# Patient Record
Sex: FEMALE | Race: WHITE | NOT HISPANIC OR LATINO | Employment: FULL TIME | URBAN - METROPOLITAN AREA
[De-identification: names, ages, dates, MRNs, and addresses within clinical notes are randomized per-mention and may not be internally consistent; named-entity substitution may affect disease eponyms.]

---

## 2023-12-27 ENCOUNTER — HOSPITAL ENCOUNTER (OUTPATIENT)
Dept: RADIOLOGY | Facility: HOSPITAL | Age: 58
Discharge: HOME | End: 2023-12-27
Payer: COMMERCIAL

## 2023-12-27 DIAGNOSIS — R56.9 UNSPECIFIED CONVULSIONS (MULTI): ICD-10-CM

## 2023-12-27 PROCEDURE — 70450 CT HEAD/BRAIN W/O DYE: CPT

## 2024-05-06 ENCOUNTER — APPOINTMENT (OUTPATIENT)
Dept: CARDIOLOGY | Facility: HOSPITAL | Age: 59
End: 2024-05-06

## 2024-05-06 ENCOUNTER — APPOINTMENT (OUTPATIENT)
Dept: RADIOLOGY | Facility: HOSPITAL | Age: 59
End: 2024-05-06

## 2024-05-06 ENCOUNTER — HOSPITAL ENCOUNTER (INPATIENT)
Facility: HOSPITAL | Age: 59
LOS: 2 days | Discharge: HOME | End: 2024-05-08
Attending: STUDENT IN AN ORGANIZED HEALTH CARE EDUCATION/TRAINING PROGRAM | Admitting: INTERNAL MEDICINE

## 2024-05-06 DIAGNOSIS — R53.1 LEFT-SIDED WEAKNESS: ICD-10-CM

## 2024-05-06 DIAGNOSIS — I63.9 CEREBROVASCULAR ACCIDENT (CVA), UNSPECIFIED MECHANISM (MULTI): Primary | ICD-10-CM

## 2024-05-06 DIAGNOSIS — R56.9 SEIZURE (MULTI): ICD-10-CM

## 2024-05-06 LAB
ALBUMIN SERPL-MCNC: 4.2 G/DL (ref 3.5–5)
ALP BLD-CCNC: 52 U/L (ref 35–125)
ALT SERPL-CCNC: 13 U/L (ref 5–40)
ANION GAP SERPL CALC-SCNC: 13 MMOL/L
APTT PPP: 27.3 SECONDS (ref 22–32.5)
AST SERPL-CCNC: 15 U/L (ref 5–40)
ATRIAL RATE: 71 BPM
BASOPHILS # BLD AUTO: 0.04 X10*3/UL (ref 0–0.1)
BASOPHILS NFR BLD AUTO: 0.7 %
BILIRUB SERPL-MCNC: 0.4 MG/DL (ref 0.1–1.2)
BUN SERPL-MCNC: 15 MG/DL (ref 8–25)
CALCIUM SERPL-MCNC: 9.1 MG/DL (ref 8.5–10.4)
CHLORIDE SERPL-SCNC: 107 MMOL/L (ref 97–107)
CHOLEST SERPL-MCNC: 189 MG/DL (ref 133–200)
CHOLEST/HDLC SERPL: 4.8 {RATIO}
CO2 SERPL-SCNC: 24 MMOL/L (ref 24–31)
CREAT SERPL-MCNC: 1 MG/DL (ref 0.4–1.6)
EGFRCR SERPLBLD CKD-EPI 2021: 65 ML/MIN/1.73M*2
EOSINOPHIL # BLD AUTO: 0.07 X10*3/UL (ref 0–0.7)
EOSINOPHIL NFR BLD AUTO: 1.1 %
ERYTHROCYTE [DISTWIDTH] IN BLOOD BY AUTOMATED COUNT: 12.8 % (ref 11.5–14.5)
EST. AVERAGE GLUCOSE BLD GHB EST-MCNC: 103 MG/DL
GLUCOSE BLD MANUAL STRIP-MCNC: 111 MG/DL (ref 74–99)
GLUCOSE BLD MANUAL STRIP-MCNC: 78 MG/DL (ref 74–99)
GLUCOSE BLD MANUAL STRIP-MCNC: 93 MG/DL (ref 74–99)
GLUCOSE BLD MANUAL STRIP-MCNC: 97 MG/DL (ref 74–99)
GLUCOSE SERPL-MCNC: 111 MG/DL (ref 65–99)
HBA1C MFR BLD: 5.2 %
HCT VFR BLD AUTO: 41.4 % (ref 36–46)
HDLC SERPL-MCNC: 39 MG/DL
HGB BLD-MCNC: 13.4 G/DL (ref 12–16)
IMM GRANULOCYTES # BLD AUTO: 0.02 X10*3/UL (ref 0–0.7)
IMM GRANULOCYTES NFR BLD AUTO: 0.3 % (ref 0–0.9)
INR PPP: 1.1 (ref 0.9–1.2)
LDLC SERPL CALC-MCNC: 135 MG/DL (ref 65–130)
LYMPHOCYTES # BLD AUTO: 2.04 X10*3/UL (ref 1.2–4.8)
LYMPHOCYTES NFR BLD AUTO: 33.5 %
MCH RBC QN AUTO: 31.6 PG (ref 26–34)
MCHC RBC AUTO-ENTMCNC: 32.4 G/DL (ref 32–36)
MCV RBC AUTO: 98 FL (ref 80–100)
MONOCYTES # BLD AUTO: 0.39 X10*3/UL (ref 0.1–1)
MONOCYTES NFR BLD AUTO: 6.4 %
NEUTROPHILS # BLD AUTO: 3.53 X10*3/UL (ref 1.2–7.7)
NEUTROPHILS NFR BLD AUTO: 58 %
NRBC BLD-RTO: 0 /100 WBCS (ref 0–0)
P AXIS: 117 DEGREES
P OFFSET: 187 MS
P ONSET: 128 MS
PLATELET # BLD AUTO: 199 X10*3/UL (ref 150–450)
POCT GLUCOSE: 97 MG/DL (ref 74–99)
POTASSIUM SERPL-SCNC: 3.6 MMOL/L (ref 3.4–5.1)
PR INTERVAL: 196 MS
PROT SERPL-MCNC: 7.6 G/DL (ref 5.9–7.9)
PROTHROMBIN TIME: 11.3 SECONDS (ref 9.3–12.7)
Q ONSET: 226 MS
QRS COUNT: 11 BEATS
QRS DURATION: 80 MS
QT INTERVAL: 372 MS
QTC CALCULATION(BAZETT): 404 MS
QTC FREDERICIA: 393 MS
R AXIS: -24 DEGREES
RBC # BLD AUTO: 4.24 X10*6/UL (ref 4–5.2)
SODIUM SERPL-SCNC: 144 MMOL/L (ref 133–145)
T AXIS: 118 DEGREES
T OFFSET: 412 MS
TRIGL SERPL-MCNC: 73 MG/DL (ref 40–150)
TROPONIN T SERPL-MCNC: 7 NG/L
TSH SERPL DL<=0.05 MIU/L-ACNC: 1.03 MIU/L (ref 0.27–4.2)
VENTRICULAR RATE: 71 BPM
VIT B12 SERPL-MCNC: 238 PG/ML (ref 211–946)
WBC # BLD AUTO: 6.1 X10*3/UL (ref 4.4–11.3)

## 2024-05-06 PROCEDURE — 99291 CRITICAL CARE FIRST HOUR: CPT | Performed by: NURSE PRACTITIONER

## 2024-05-06 PROCEDURE — 2500000004 HC RX 250 GENERAL PHARMACY W/ HCPCS (ALT 636 FOR OP/ED)

## 2024-05-06 PROCEDURE — 2020000001 HC ICU ROOM DAILY

## 2024-05-06 PROCEDURE — 82947 ASSAY GLUCOSE BLOOD QUANT: CPT

## 2024-05-06 PROCEDURE — 2500000005 HC RX 250 GENERAL PHARMACY W/O HCPCS: Performed by: NURSE PRACTITIONER

## 2024-05-06 PROCEDURE — 70498 CT ANGIOGRAPHY NECK: CPT

## 2024-05-06 PROCEDURE — 70551 MRI BRAIN STEM W/O DYE: CPT

## 2024-05-06 PROCEDURE — 2550000001 HC RX 255 CONTRASTS: Performed by: INTERNAL MEDICINE

## 2024-05-06 PROCEDURE — 70496 CT ANGIOGRAPHY HEAD: CPT | Performed by: RADIOLOGY

## 2024-05-06 PROCEDURE — 84484 ASSAY OF TROPONIN QUANT: CPT

## 2024-05-06 PROCEDURE — 85025 COMPLETE CBC W/AUTO DIFF WBC: CPT

## 2024-05-06 PROCEDURE — 36415 COLL VENOUS BLD VENIPUNCTURE: CPT

## 2024-05-06 PROCEDURE — 93005 ELECTROCARDIOGRAM TRACING: CPT

## 2024-05-06 PROCEDURE — 2500000004 HC RX 250 GENERAL PHARMACY W/ HCPCS (ALT 636 FOR OP/ED): Mod: JW | Performed by: STUDENT IN AN ORGANIZED HEALTH CARE EDUCATION/TRAINING PROGRAM

## 2024-05-06 PROCEDURE — 96361 HYDRATE IV INFUSION ADD-ON: CPT

## 2024-05-06 PROCEDURE — 70498 CT ANGIOGRAPHY NECK: CPT | Performed by: RADIOLOGY

## 2024-05-06 PROCEDURE — 85610 PROTHROMBIN TIME: CPT

## 2024-05-06 PROCEDURE — 70450 CT HEAD/BRAIN W/O DYE: CPT | Performed by: STUDENT IN AN ORGANIZED HEALTH CARE EDUCATION/TRAINING PROGRAM

## 2024-05-06 PROCEDURE — 2500000001 HC RX 250 WO HCPCS SELF ADMINISTERED DRUGS (ALT 637 FOR MEDICARE OP): Performed by: NURSE PRACTITIONER

## 2024-05-06 PROCEDURE — 92610 EVALUATE SWALLOWING FUNCTION: CPT | Mod: GN | Performed by: SPEECH-LANGUAGE PATHOLOGIST

## 2024-05-06 PROCEDURE — 70551 MRI BRAIN STEM W/O DYE: CPT | Performed by: STUDENT IN AN ORGANIZED HEALTH CARE EDUCATION/TRAINING PROGRAM

## 2024-05-06 PROCEDURE — 84443 ASSAY THYROID STIM HORMONE: CPT | Performed by: PSYCHIATRY & NEUROLOGY

## 2024-05-06 PROCEDURE — 3E03317 INTRODUCTION OF OTHER THROMBOLYTIC INTO PERIPHERAL VEIN, PERCUTANEOUS APPROACH: ICD-10-PCS | Performed by: STUDENT IN AN ORGANIZED HEALTH CARE EDUCATION/TRAINING PROGRAM

## 2024-05-06 PROCEDURE — S4991 NICOTINE PATCH NONLEGEND: HCPCS | Performed by: NURSE PRACTITIONER

## 2024-05-06 PROCEDURE — 80061 LIPID PANEL: CPT | Performed by: NURSE PRACTITIONER

## 2024-05-06 PROCEDURE — 96374 THER/PROPH/DIAG INJ IV PUSH: CPT

## 2024-05-06 PROCEDURE — 70450 CT HEAD/BRAIN W/O DYE: CPT

## 2024-05-06 PROCEDURE — 85730 THROMBOPLASTIN TIME PARTIAL: CPT

## 2024-05-06 PROCEDURE — 2500000002 HC RX 250 W HCPCS SELF ADMINISTERED DRUGS (ALT 637 FOR MEDICARE OP, ALT 636 FOR OP/ED): Performed by: NURSE PRACTITIONER

## 2024-05-06 PROCEDURE — 82607 VITAMIN B-12: CPT | Performed by: PSYCHIATRY & NEUROLOGY

## 2024-05-06 PROCEDURE — 99291 CRITICAL CARE FIRST HOUR: CPT | Performed by: STUDENT IN AN ORGANIZED HEALTH CARE EDUCATION/TRAINING PROGRAM

## 2024-05-06 PROCEDURE — 83036 HEMOGLOBIN GLYCOSYLATED A1C: CPT | Performed by: NURSE PRACTITIONER

## 2024-05-06 PROCEDURE — 83718 ASSAY OF LIPOPROTEIN: CPT | Performed by: NURSE PRACTITIONER

## 2024-05-06 PROCEDURE — 2500000004 HC RX 250 GENERAL PHARMACY W/ HCPCS (ALT 636 FOR OP/ED): Performed by: NURSE PRACTITIONER

## 2024-05-06 PROCEDURE — 84075 ASSAY ALKALINE PHOSPHATASE: CPT

## 2024-05-06 RX ORDER — LEVETIRACETAM 500 MG/1
1000 TABLET ORAL 2 TIMES DAILY
Status: DISCONTINUED | OUTPATIENT
Start: 2024-05-06 | End: 2024-05-07

## 2024-05-06 RX ORDER — ASPIRIN 81 MG/1
81 TABLET ORAL DAILY
Status: DISCONTINUED | OUTPATIENT
Start: 2024-05-07 | End: 2024-05-08 | Stop reason: HOSPADM

## 2024-05-06 RX ORDER — DEXTROSE 50 % IN WATER (D50W) INTRAVENOUS SYRINGE
25
Status: DISCONTINUED | OUTPATIENT
Start: 2024-05-06 | End: 2024-05-08 | Stop reason: HOSPADM

## 2024-05-06 RX ORDER — HYDRALAZINE HYDROCHLORIDE 20 MG/ML
10 INJECTION INTRAMUSCULAR; INTRAVENOUS
Status: ACTIVE | OUTPATIENT
Start: 2024-05-06 | End: 2024-05-08

## 2024-05-06 RX ORDER — HYDRALAZINE HYDROCHLORIDE 25 MG/1
25 TABLET, FILM COATED ORAL EVERY 6 HOURS PRN
Status: DISCONTINUED | OUTPATIENT
Start: 2024-05-08 | End: 2024-05-08 | Stop reason: HOSPADM

## 2024-05-06 RX ORDER — ATORVASTATIN CALCIUM 80 MG/1
80 TABLET, FILM COATED ORAL NIGHTLY
Status: DISCONTINUED | OUTPATIENT
Start: 2024-05-06 | End: 2024-05-08 | Stop reason: HOSPADM

## 2024-05-06 RX ORDER — IBUPROFEN 200 MG
1 TABLET ORAL DAILY
Status: DISCONTINUED | OUTPATIENT
Start: 2024-05-06 | End: 2024-05-08 | Stop reason: HOSPADM

## 2024-05-06 RX ORDER — LEVETIRACETAM 1000 MG/1
1000 TABLET ORAL 2 TIMES DAILY
COMMUNITY
Start: 2023-10-28 | End: 2024-05-08 | Stop reason: HOSPADM

## 2024-05-06 RX ORDER — ENOXAPARIN SODIUM 100 MG/ML
40 INJECTION SUBCUTANEOUS EVERY 24 HOURS
Status: DISCONTINUED | OUTPATIENT
Start: 2024-05-06 | End: 2024-05-08 | Stop reason: HOSPADM

## 2024-05-06 RX ORDER — LORAZEPAM 2 MG/ML
INJECTION INTRAMUSCULAR
Status: COMPLETED
Start: 2024-05-06 | End: 2024-05-06

## 2024-05-06 RX ORDER — ATORVASTATIN CALCIUM 40 MG/1
40 TABLET, FILM COATED ORAL NIGHTLY
Status: DISCONTINUED | OUTPATIENT
Start: 2024-05-06 | End: 2024-05-06

## 2024-05-06 RX ORDER — POLYETHYLENE GLYCOL 3350 17 G/17G
17 POWDER, FOR SOLUTION ORAL DAILY
Status: DISCONTINUED | OUTPATIENT
Start: 2024-05-06 | End: 2024-05-08 | Stop reason: HOSPADM

## 2024-05-06 RX ORDER — NICOTINE 7MG/24HR
1 PATCH, TRANSDERMAL 24 HOURS TRANSDERMAL DAILY
Status: DISCONTINUED | OUTPATIENT
Start: 2024-06-17 | End: 2024-05-08 | Stop reason: HOSPADM

## 2024-05-06 RX ORDER — DEXTROSE 50 % IN WATER (D50W) INTRAVENOUS SYRINGE
12.5
Status: DISCONTINUED | OUTPATIENT
Start: 2024-05-06 | End: 2024-05-08 | Stop reason: HOSPADM

## 2024-05-06 RX ORDER — LABETALOL HYDROCHLORIDE 5 MG/ML
10 INJECTION, SOLUTION INTRAVENOUS EVERY 10 MIN PRN
Status: ACTIVE | OUTPATIENT
Start: 2024-05-06 | End: 2024-05-08

## 2024-05-06 RX ADMIN — POLYETHYLENE GLYCOL 3350 17 G: 17 POWDER, FOR SOLUTION ORAL at 20:36

## 2024-05-06 RX ADMIN — LEVETIRACETAM 1000 MG: 500 TABLET, FILM COATED ORAL at 20:35

## 2024-05-06 RX ADMIN — ATORVASTATIN CALCIUM 80 MG: 80 TABLET, FILM COATED ORAL at 20:36

## 2024-05-06 RX ADMIN — SODIUM CHLORIDE 1000 ML: 900 INJECTION, SOLUTION INTRAVENOUS at 11:54

## 2024-05-06 RX ADMIN — IOHEXOL 75 ML: 350 INJECTION, SOLUTION INTRAVENOUS at 16:02

## 2024-05-06 RX ADMIN — LORAZEPAM 1 MG: 2 INJECTION INTRAMUSCULAR; INTRAVENOUS at 09:44

## 2024-05-06 RX ADMIN — Medication 2 L/MIN: at 21:29

## 2024-05-06 RX ADMIN — Medication 1 PATCH: at 13:53

## 2024-05-06 RX ADMIN — Medication 24 MG: at 09:57

## 2024-05-06 SDOH — ECONOMIC STABILITY: FOOD INSECURITY: WITHIN THE PAST 12 MONTHS, THE FOOD YOU BOUGHT JUST DIDN'T LAST AND YOU DIDN'T HAVE MONEY TO GET MORE.: NEVER TRUE

## 2024-05-06 SDOH — SOCIAL STABILITY: SOCIAL INSECURITY
WITHIN THE LAST YEAR, HAVE YOU BEEN KICKED, HIT, SLAPPED, OR OTHERWISE PHYSICALLY HURT BY YOUR PARTNER OR EX-PARTNER?: NO

## 2024-05-06 SDOH — ECONOMIC STABILITY: HOUSING INSECURITY
IN THE LAST 12 MONTHS, WAS THERE A TIME WHEN YOU DID NOT HAVE A STEADY PLACE TO SLEEP OR SLEPT IN A SHELTER (INCLUDING NOW)?: NO

## 2024-05-06 SDOH — SOCIAL STABILITY: SOCIAL NETWORK
DO YOU BELONG TO ANY CLUBS OR ORGANIZATIONS SUCH AS CHURCH GROUPS, UNIONS, FRATERNAL OR ATHLETIC GROUPS, OR SCHOOL GROUPS?: NO

## 2024-05-06 SDOH — SOCIAL STABILITY: SOCIAL INSECURITY: WITHIN THE LAST YEAR, HAVE YOU BEEN AFRAID OF YOUR PARTNER OR EX-PARTNER?: NO

## 2024-05-06 SDOH — SOCIAL STABILITY: SOCIAL INSECURITY: WITHIN THE LAST YEAR, HAVE YOU BEEN HUMILIATED OR EMOTIONALLY ABUSED IN OTHER WAYS BY YOUR PARTNER OR EX-PARTNER?: NO

## 2024-05-06 SDOH — HEALTH STABILITY: PHYSICAL HEALTH: ON AVERAGE, HOW MANY DAYS PER WEEK DO YOU ENGAGE IN MODERATE TO STRENUOUS EXERCISE (LIKE A BRISK WALK)?: 4 DAYS

## 2024-05-06 SDOH — SOCIAL STABILITY: SOCIAL NETWORK: HOW OFTEN DO YOU ATTEND CHURCH OR RELIGIOUS SERVICES?: MORE THAN 4 TIMES PER YEAR

## 2024-05-06 SDOH — SOCIAL STABILITY: SOCIAL INSECURITY
WITHIN THE LAST YEAR, HAVE YOU BEEN RAPED OR FORCED TO HAVE ANY KIND OF SEXUAL ACTIVITY BY YOUR PARTNER OR EX-PARTNER?: NO

## 2024-05-06 SDOH — HEALTH STABILITY: MENTAL HEALTH
STRESS IS WHEN SOMEONE FEELS TENSE, NERVOUS, ANXIOUS, OR CAN'T SLEEP AT NIGHT BECAUSE THEIR MIND IS TROUBLED. HOW STRESSED ARE YOU?: TO SOME EXTENT

## 2024-05-06 SDOH — SOCIAL STABILITY: SOCIAL INSECURITY: HAVE YOU HAD ANY THOUGHTS OF HARMING ANYONE ELSE?: NO

## 2024-05-06 SDOH — SOCIAL STABILITY: SOCIAL NETWORK: ARE YOU MARRIED, WIDOWED, DIVORCED, SEPARATED, NEVER MARRIED, OR LIVING WITH A PARTNER?: WIDOWED

## 2024-05-06 SDOH — SOCIAL STABILITY: SOCIAL NETWORK: HOW OFTEN DO YOU ATTEND MEETINGS OF THE CLUBS OR ORGANIZATIONS YOU BELONG TO?: NEVER

## 2024-05-06 SDOH — SOCIAL STABILITY: SOCIAL INSECURITY
WITHIN THE LAST YEAR, HAVE TO BEEN RAPED OR FORCED TO HAVE ANY KIND OF SEXUAL ACTIVITY BY YOUR PARTNER OR EX-PARTNER?: NO

## 2024-05-06 SDOH — SOCIAL STABILITY: SOCIAL NETWORK
DO YOU BELONG TO ANY CLUBS OR ORGANIZATIONS SUCH AS CHURCH GROUPS UNIONS, FRATERNAL OR ATHLETIC GROUPS, OR SCHOOL GROUPS?: NO

## 2024-05-06 SDOH — ECONOMIC STABILITY: FOOD INSECURITY: WITHIN THE PAST 12 MONTHS, YOU WORRIED THAT YOUR FOOD WOULD RUN OUT BEFORE YOU GOT THE MONEY TO BUY MORE.: NEVER TRUE

## 2024-05-06 SDOH — ECONOMIC STABILITY: HOUSING INSECURITY: IN THE LAST 12 MONTHS, HOW MANY PLACES HAVE YOU LIVED?: 1

## 2024-05-06 SDOH — ECONOMIC STABILITY: TRANSPORTATION INSECURITY
IN THE PAST 12 MONTHS, HAS LACK OF TRANSPORTATION KEPT YOU FROM MEETINGS, WORK, OR FROM GETTING THINGS NEEDED FOR DAILY LIVING?: NO

## 2024-05-06 SDOH — SOCIAL STABILITY: SOCIAL INSECURITY: WERE YOU ABLE TO COMPLETE ALL THE BEHAVIORAL HEALTH SCREENINGS?: YES

## 2024-05-06 SDOH — SOCIAL STABILITY: SOCIAL INSECURITY: ARE YOU MARRIED, WIDOWED, DIVORCED, SEPARATED, NEVER MARRIED, OR LIVING WITH A PARTNER?: WIDOWED

## 2024-05-06 SDOH — HEALTH STABILITY: PHYSICAL HEALTH: ON AVERAGE, HOW MANY MINUTES DO YOU ENGAGE IN EXERCISE AT THIS LEVEL?: 30 MIN

## 2024-05-06 SDOH — ECONOMIC STABILITY: HOUSING INSECURITY: IN THE LAST 12 MONTHS, WAS THERE A TIME WHEN YOU WERE NOT ABLE TO PAY THE MORTGAGE OR RENT ON TIME?: NO

## 2024-05-06 SDOH — SOCIAL STABILITY: SOCIAL NETWORK: HOW OFTEN DO YOU GET TOGETHER WITH FRIENDS OR RELATIVES?: NEVER

## 2024-05-06 SDOH — SOCIAL STABILITY: SOCIAL INSECURITY: DO YOU FEEL ANYONE HAS EXPLOITED OR TAKEN ADVANTAGE OF YOU FINANCIALLY OR OF YOUR PERSONAL PROPERTY?: NO

## 2024-05-06 SDOH — SOCIAL STABILITY: SOCIAL INSECURITY: HAVE YOU HAD THOUGHTS OF HARMING ANYONE ELSE?: NO

## 2024-05-06 SDOH — ECONOMIC STABILITY: TRANSPORTATION INSECURITY
IN THE PAST 12 MONTHS, HAS THE LACK OF TRANSPORTATION KEPT YOU FROM MEDICAL APPOINTMENTS OR FROM GETTING MEDICATIONS?: NO

## 2024-05-06 SDOH — SOCIAL STABILITY: SOCIAL INSECURITY: HAS ANYONE EVER THREATENED TO HURT YOUR FAMILY OR YOUR PETS?: NO

## 2024-05-06 SDOH — ECONOMIC STABILITY: FOOD INSECURITY: WITHIN THE PAST 12 MONTHS, YOU WORRIED THAT YOUR FOOD WOULD RUN OUT BEFORE YOU GOT MONEY TO BUY MORE.: NEVER TRUE

## 2024-05-06 SDOH — ECONOMIC STABILITY: FOOD INSECURITY: HOW HARD IS IT FOR YOU TO PAY FOR THE VERY BASICS LIKE FOOD, HOUSING, MEDICAL CARE, AND HEATING?: NOT VERY HARD

## 2024-05-06 SDOH — HEALTH STABILITY: MENTAL HEALTH
DO YOU FEEL STRESS - TENSE, RESTLESS, NERVOUS, OR ANXIOUS, OR UNABLE TO SLEEP AT NIGHT BECAUSE YOUR MIND IS TROUBLED ALL THE TIME - THESE DAYS?: TO SOME EXTENT

## 2024-05-06 SDOH — ECONOMIC STABILITY: INCOME INSECURITY: IN THE LAST 12 MONTHS, WAS THERE A TIME WHEN YOU WERE NOT ABLE TO PAY THE MORTGAGE OR RENT ON TIME?: NO

## 2024-05-06 SDOH — HEALTH STABILITY: MENTAL HEALTH: EXPERIENCED ANY OF THE FOLLOWING LIFE EVENTS: OTHER (COMMENT)

## 2024-05-06 SDOH — SOCIAL STABILITY: SOCIAL INSECURITY: DO YOU FEEL UNSAFE GOING BACK TO THE PLACE WHERE YOU ARE LIVING?: NO

## 2024-05-06 SDOH — ECONOMIC STABILITY: INCOME INSECURITY: HOW HARD IS IT FOR YOU TO PAY FOR THE VERY BASICS LIKE FOOD, HOUSING, MEDICAL CARE, AND HEATING?: NOT VERY HARD

## 2024-05-06 SDOH — SOCIAL STABILITY: SOCIAL NETWORK
IN A TYPICAL WEEK, HOW MANY TIMES DO YOU TALK ON THE PHONE WITH FAMILY, FRIENDS, OR NEIGHBORS?: MORE THAN THREE TIMES A WEEK

## 2024-05-06 SDOH — SOCIAL STABILITY: SOCIAL NETWORK: HOW OFTEN DO YOU ATTENT MEETINGS OF THE CLUB OR ORGANIZATION YOU BELONG TO?: NEVER

## 2024-05-06 SDOH — SOCIAL STABILITY: SOCIAL INSECURITY: ARE YOU OR HAVE YOU BEEN THREATENED OR ABUSED PHYSICALLY, EMOTIONALLY, OR SEXUALLY BY ANYONE?: NO

## 2024-05-06 SDOH — SOCIAL STABILITY: SOCIAL INSECURITY: DOES ANYONE TRY TO KEEP YOU FROM HAVING/CONTACTING OTHER FRIENDS OR DOING THINGS OUTSIDE YOUR HOME?: NO

## 2024-05-06 SDOH — ECONOMIC STABILITY: TRANSPORTATION INSECURITY: IN THE PAST 12 MONTHS, HAS LACK OF TRANSPORTATION KEPT YOU FROM MEDICAL APPOINTMENTS OR FROM GETTING MEDICATIONS?: NO

## 2024-05-06 SDOH — SOCIAL STABILITY: SOCIAL INSECURITY: ARE THERE ANY APPARENT SIGNS OF INJURIES/BEHAVIORS THAT COULD BE RELATED TO ABUSE/NEGLECT?: NO

## 2024-05-06 SDOH — SOCIAL STABILITY: SOCIAL INSECURITY: ABUSE: ADULT

## 2024-05-06 ASSESSMENT — PAIN - FUNCTIONAL ASSESSMENT
PAIN_FUNCTIONAL_ASSESSMENT: 0-10

## 2024-05-06 ASSESSMENT — COGNITIVE AND FUNCTIONAL STATUS - GENERAL
DAILY ACTIVITIY SCORE: 24
MOBILITY SCORE: 24
PATIENT BASELINE BEDBOUND: NO

## 2024-05-06 ASSESSMENT — LIFESTYLE VARIABLES
SUBSTANCE_ABUSE_PAST_12_MONTHS: NO
AUDIT-C TOTAL SCORE: 0
HOW MANY STANDARD DRINKS CONTAINING ALCOHOL DO YOU HAVE ON A TYPICAL DAY: PATIENT DOES NOT DRINK
HOW OFTEN DO YOU HAVE 6 OR MORE DRINKS ON ONE OCCASION: NEVER
HOW OFTEN DO YOU HAVE A DRINK CONTAINING ALCOHOL: NEVER
AUDIT-C TOTAL SCORE: 0
SKIP TO QUESTIONS 9-10: 1
PRESCIPTION_ABUSE_PAST_12_MONTHS: NO

## 2024-05-06 ASSESSMENT — PAIN SCALES - GENERAL
PAINLEVEL_OUTOF10: 0 - NO PAIN

## 2024-05-06 ASSESSMENT — ACTIVITIES OF DAILY LIVING (ADL)
FEEDING YOURSELF: INDEPENDENT
ADEQUATE_TO_COMPLETE_ADL: YES
WALKS IN HOME: INDEPENDENT
BATHING: INDEPENDENT
TOILETING: INDEPENDENT
DRESSING YOURSELF: INDEPENDENT
LACK_OF_TRANSPORTATION: NO
GROOMING: INDEPENDENT
JUDGMENT_ADEQUATE_SAFELY_COMPLETE_DAILY_ACTIVITIES: YES
HEARING - LEFT EAR: FUNCTIONAL
PATIENT'S MEMORY ADEQUATE TO SAFELY COMPLETE DAILY ACTIVITIES?: YES
HEARING - RIGHT EAR: FUNCTIONAL

## 2024-05-06 ASSESSMENT — ENCOUNTER SYMPTOMS
SPEECH DIFFICULTY: 1
SEIZURES: 1
WEAKNESS: 1

## 2024-05-06 ASSESSMENT — PATIENT HEALTH QUESTIONNAIRE - PHQ9
2. FEELING DOWN, DEPRESSED OR HOPELESS: SEVERAL DAYS
SUM OF ALL RESPONSES TO PHQ9 QUESTIONS 1 & 2: 2
1. LITTLE INTEREST OR PLEASURE IN DOING THINGS: SEVERAL DAYS

## 2024-05-06 NOTE — ED TRIAGE NOTES
LEFT sided weakness that started 0850 unequal arm strength unable to lift arm and leg on left side. Drift noted on left side. Pt has hx of seizures

## 2024-05-06 NOTE — H&P
History Of Present Illness  Lexy Dunaway is a 58 y.o. female with PMHx of remote R MCA infarct and seizures now presenting with acute left sided weakness and difficulty with word finding.  Last known normal was about 0830 when she notified a co-worker that she was having left sided jerking paresthesias and weakness of the LUE.  Symptoms evolved to include severe left sided weakness and difficulty with word finding.  She was BIBA to [facility] with an NIH score of 10, had grossly normal labs and a head CT negative for acute bleed, and was administered weight based TNK.  He symptoms have normalized now with an NIH of 0.  She is admitted to the ICU for post CVA thrombolytic monitoring and care.          She sees Dr. Davy Mckeon for Neurology, last seen in March, 2024.  NO changes, continued AED keppra.  Last seizure was in 2017.         Past Medical History  Past Medical History:   Diagnosis Date    Seizures (Multi)      Home Medications:  Keppra 1000mg BID     Surgical History  History reviewed. No pertinent surgical history.     Social History  She reports that she has been smoking cigarettes. She does not have any smokeless tobacco history on file. No history on file for alcohol use and drug use.    Family History  No family history on file.     Allergies  Patient has no known allergies.    Review of Systems   Neurological:  Positive for seizures, speech difficulty and weakness.        Improved s/p TNK   All other systems reviewed and are negative.    Currently complaint free      Physical Exam  Vitals and nursing note reviewed.   Constitutional:       Appearance: She is obese.   Eyes:      Pupils: Pupils are equal, round, and reactive to light.   Cardiovascular:      Rate and Rhythm: Normal rate and regular rhythm.   Pulmonary:      Effort: Pulmonary effort is normal.      Breath sounds: Normal breath sounds.   Abdominal:      General: Bowel sounds are normal. There is no distension.      Tenderness: There is  "no abdominal tenderness.   Musculoskeletal:         General: Normal range of motion.   Skin:     General: Skin is warm and dry.      Capillary Refill: Capillary refill takes less than 2 seconds.   Neurological:      Mental Status: She is alert and oriented to person, place, and time.      Cranial Nerves: No cranial nerve deficit.      Motor: No weakness.   Psychiatric:         Mood and Affect: Mood normal.         Behavior: Behavior normal.          Last Recorded Vitals  Blood pressure 108/67, pulse 67, temperature 36.6 °C (97.9 °F), resp. rate (!) 22, height 1.575 m (5' 2\"), weight 96.7 kg (213 lb 3 oz), SpO2 99%.    Relevant Results      ECG 12 lead    Result Date: 5/6/2024  Sinus rhythm with Premature ventricular complexes or Fusion complexes Cannot rule out Anterior infarct , age undetermined ST & T wave abnormality, consider lateral ischemia Abnormal ECG When compared with ECG of 06-MAY-2024 09:29, (unconfirmed) Previous ECG has undetermined rhythm, needs review    CT brain attack head wo IV contrast    Result Date: 5/6/2024  Interpreted By:  Johnny Haro, STUDY: CT BRAIN ATTACK HEAD WO IV CONTRAST;  5/6/2024 9:25 am   INDICATION: Signs/Symptoms:Stroke Evaluation.   COMPARISON: CT brain 12/27/2023 and MRI brain 05/23/2023   ACCESSION NUMBER(S): [id number]   ORDERING CLINICIAN: RAFFAELE LOPEZ   TECHNIQUE: Noncontrast axial CT scan of head was performed.   FINDINGS: Parenchyma: There is no intracranial hemorrhage. The grey-white differentiation is intact. There is no mass effect or midline shift. Redemonstration encephalomalacia right frontotemporal lobe, from prior right MCA distribution infarct.   CSF Spaces: The ventricles, sulci and basal cisterns are within normal limits for age.   Extra-Axial Fluid: There is no extraaxial fluid collection.   Calvarium: The calvarium is unremarkable.   Paranasal sinuses: Visualized paranasal sinuses are clear.   Mastoids: Clear.   Orbits: Normal.   Soft tissues: " Unremarkable.       No acute intracranial hemorrhage, mass effect, or CT apparent acute infarct. Stable remote large right MCA distribution infarct.   Johnny Haro discussed the significance and urgency of this critical finding by telephone with  RAFFAELEYOLA LOPEZ on 5/6/2024 at 10:07 am.  (**-RCF-**) Findings:  See findings.         Signed by: Johnny Haro 5/6/2024 10:07 AM Dictation workstation:   [id number]       Assessment/Plan   Principal Problem:    Cerebrovascular accident (CVA), unspecified mechanism (Multi)  58 y.o. female with PMHx of remote R MCA infarct and seizures now presenting with acute CVA with left sided weakness and difficulty with word finding.  She presented with NIH score of 10, had grossly normal labs and a head CT negative for acute bleed, and was administered weight based TNK.  He symptoms have normalized now with an NIH of 0.  She is admitted to the ICU for post CVA thrombolytic monitoring and care     - CVA Thrombolytic Order Set per protocol   - Follow up head CT in the morning or sooner if acute change in neuro exam  - Neuro Consult   - Check Hemoglobin A1c and lipid panel for CV risk stratification   - Nicotine patch for acute nicotine cessation; 1.5 ppd x 30 years, recently converted to vape          I spent 38 minutes in the professional and overall care of this patient.    Dispo:  ICU for close neuro monitoring     ASPEN Arias-CNP

## 2024-05-06 NOTE — ED PROVIDER NOTES
History/Exam limitations: none.   Additional history was obtained from patient and EMS personnel.    HPI:       Lexy Dunaway is a 58 y.o. with a history of previous large remote right MCA infarct presenting to the emergency department with left-sided weakness.  Patient was reportedly at work when symptoms started around 8:30 AM.  She started having difficulty moving her left arm and left leg as well as difficulty with speech and therefore EMS was called.  EMS code brain attack in the field.  Patient is not on any blood thinners and is only taking Keppra due to history of seizures.  Upon arrival patient is complaining of left-sided weakness and difficulty talking.  She denies any chest pain, shortness of breath, head injury or head trauma.     Last Known Well Time: 0830        ------------------------------------------------------------------------------------------------------------------------------------------     Physical Exam:    ED Triage Vitals [05/06/24 0929]   Temp Heart Rate Respirations BP   -- 74 (!) 28 (!) 140/108      Pulse Ox Temp src Heart Rate Source Patient Position   99 % -- -- --      BP Location FiO2 (%)     -- --          Gen: Not in acute distress  Head/Neck: NCAT  Eyes: Anicteric sclerae, noninjected conjunctivae  Nose: No rhinorrhea  Mouth:  MMM  Heart: Regular rate and rhythm w/ no murmurs, rubs, gallops  Lungs: CTAB w/o wheezes, rales, rhonchi, no increased work of breathing  Abdomen: Soft, NT/ND  Musculoskeletal: No deformities  Extremities: No edema.  Neurologic: Please see below for NIHSS  Skin: No rashes noted  Psychological: Calm        Interval: Baseline  Time: 12:04 PM  Person Administering Scale: Senia Holly PA-C     1a  Level of consciousness: 1=not alert but arousable by minor stimulation to obey, answer or respond   1b. LOC questions:  0=Performs both tasks correctly   1c. LOC commands: 0=Performs both tasks correctly   2.  Best Gaze: 0=normal   3. Visual: 0=No visual loss    4. Facial Palsy: 1=Minor paralysis (flattened nasolabial fold, asymmetric on smiling)   5a. Motor left arm: 2=Some effort against gravity, limb cannot get to or maintain (if cured) 90 (or 45) degrees, drifts down to bed, but has some effort against gravity   5b.  Motor right arm: 0=No drift, limb holds 90 (or 45) degrees for full 10 seconds   6a. motor left le=Drift, limb holds 90 (or 45) degrees but drifts down before full 10 seconds: does not hit bed   6b  Motor right le=No drift, limb holds 90 (or 45) degrees for full 10 seconds   7. Limb Ataxia: 0=Absent   8.  Sensory: 2=Severe to total sensory loss; patient is not aware of being touched in face, arm, leg   9. Best Language:  0=No aphasia, normal   10. Dysarthria: 1=Mild to moderate, patient slurs at least some words and at worst, can be understood with some difficulty   11. Extinction and Inattention: 2=Profound andreia-inattention or andreia-inattention to more than one modality. Does not recognize own hand or orients only to one side of space     Total:   10         VAN: VAN: Negative     ------------------------------------------------------------------------------------------------------------------------------------------     Medical Decision Making:     Patient seen in conjunction with attending physician Dr. Meredith.     Patient is a 58-year-old female presenting to the emergency department for evaluation of left-sided weakness.  Code brain attack was called due to NIH of 10 due to loss of sensation of the left arm and leg, left arm having minimal effort against gravity, drift to the left leg, facial droop to the left and slurred speech.  CT of the head showed no acute intracranial hemorrhage with a stable remote large right MCA infarct.  Patient is not on any blood thinners and therefore TNK was given and consent was signed after discussion about the risks of giving TNK.  Initial troponin 7, PT and INR normal.  CBC showed no leukocytosis or  anemia.  CMP showed no Offerman abnormalities, glucose 97.  After TNK was given patient began to improve.  Repeat examination showed a NIH of 4.  Due to TNK administration patient warrants ICU admission for every hour neurochecks.  I spoke with the intensivist team who agreed with admission for further management of TIA/CVA.     CT brain attack head wo IV contrast   Final Result   No acute intracranial hemorrhage, mass effect, or CT apparent acute   infarct. Stable remote large right MCA distribution infarct.        Johnny Haro discussed the significance and urgency of this   critical finding by telephone with  RAFFAELE LOPEZ on 5/6/2024 at 10:07   am.  (**-RCF-**) Findings:  See findings.                       Signed by: Johnny Haro 5/6/2024 10:07 AM   Dictation workstation:   [id number]        Labs Reviewed   COMPREHENSIVE METABOLIC PANEL - Abnormal       Result Value    Glucose 111 (*)     Sodium 144      Potassium 3.6      Chloride 107      Bicarbonate 24      Urea Nitrogen 15      Creatinine 1.00      eGFR 65      Calcium 9.1      Albumin 4.2      Alkaline Phosphatase 52      Total Protein 7.6      AST 15      Bilirubin, Total 0.4      ALT 13      Anion Gap 13     TROPONIN T, HIGH SENSITIVITY - Normal    Troponin T, High Sensitivity 7     PROTIME-INR - Normal    Protime 11.3      INR 1.1      Narrative:     INR Therapeutic Range: 2.0-3.5   APTT - Normal    aPTT 27.3     POCT GLUCOSE - Normal    POCT Glucose 97     POCT GLUCOSE METER - Normal    POCT Glucose 97     CBC WITH AUTO DIFFERENTIAL    WBC 6.1      nRBC 0.0      RBC 4.24      Hemoglobin 13.4      Hematocrit 41.4      MCV 98      MCH 31.6      MCHC 32.4      RDW 12.8      Platelets 199      Neutrophils % 58.0      Immature Granulocytes %, Automated 0.3      Lymphocytes % 33.5      Monocytes % 6.4      Eosinophils % 1.1      Basophils % 0.7      Neutrophils Absolute 3.53      Immature Granulocytes Absolute, Automated 0.02      Lymphocytes  Absolute 2.04      Monocytes Absolute 0.39      Eosinophils Absolute 0.07      Basophils Absolute 0.04           ED Course as of 05/06/24 1204   Mon May 06, 2024   0932 EKG my interpretation shows sinus rhythm, rate of 71 beats minute.  There is significant baseline artifact due to patient tremor.  QTc is 404 ms, NJ interval 196.  There is no obvious ST elevation or depression, no acute ischemic pattern.  No STEMI. [NT]   0949 I reassessed the patient, NIH is 10.  She has complete loss of sensation to the left arm and leg, left arm has minimal effort against gravity, left leg drifts to the bed.  She typically has normal strength in all extremities.  She also has some facial droop and mild slurred speech.  NIH is 10.  I feel that given her left-sided weakness this is significantly disabling and I am concerned for stroke.  She is not on any anticoagulation, head CT which I reviewed does not appear to have any evidence of mass effect or infarct.  She does have a history of ischemic stroke more than 2 years ago.  No history of hemorrhage.  Informed consent was obtained for TNK administration including discussion of the risks such as intracranial hemorrhage, GI bleeding, other life-threatening hemorrhage that can be caused by this medication. [NT]      ED Course User Index  [NT] Jorge Luis Meredith DO         Diagnoses as of 05/06/24 1204   Cerebrovascular accident (CVA), unspecified mechanism (Multi)   Left-sided weakness     EKG interpreted by my physician     Independent Interpretation of Studies: I independently interpreted the CT head and see No obvious evidence of intracranial hemorrhage    Chronic Medical Conditions Significantly Affecting Care: History of CVA/TIA    Social Determinants of Health Significantly Affecting Care:  None     External Records Reviewed: I reviewed recent and relevant outside records including: Previous CT of the head on 12/1/2023 showing a large amount right MCA infarct.     Discussion  of Management with Other Providers:   I discussed the patient/results with: Intensivist admitting team Dr. Hanks and Milo who agreed with admission.       IV Thrombolysis IV Thrombolysis Checklist        IV Thrombolysis Given: Yes Thrombolysis Administration: administration time 0957 Patient meets inclusion and exclusion criteria with expected benefits exceeding the risks of IV thrombolysis therapy or withholding therapy. Patient/ family understand potential risks & benefits and consent to IV Thrombolysis.   Discussed the diagnosis of suspected ischemic stroke and options for treatment, including alternative treatments. For patients meeting inclusion and exclusion criteria, the expected benefits exceed the risks of IV thrombolysis therapy or withholding therapy. The main risk of IV thrombolysis therapy is bleeding into the brain or body that may require blood transfusions or lead to death. In clinical studies, the rate of death was not higher in patients who received IV thrombolysis compared to those who did not. Other risks include allergic reactions, and with any procedure there is always the possibility of an unexpected complication.  Were there delays to thrombolysis administration?: no        Procedure  Procedures     Patient had a high probability of life-threatening deterioration due to injuries or symptoms concerning for TIA/CVA requiring my direct attention, intervention and management. I spent 42 minutes of critical care time with this patient involving bedside care, chart review, interpreting labs and diagnostics, and consultations.  Excluding separately billable procedures.     Senia Holly PA-C  05/06/24 [id number]

## 2024-05-06 NOTE — PROGRESS NOTES
Pharmacy Medication History Review    Lexy Dunaway is a 58 y.o. female admitted for Cerebrovascular accident (CVA), unspecified mechanism (Multi). Pharmacy reviewed the patient's [id number] medications and allergies for accuracy.    Medications ADDED:  none  Medications CHANGED:  Levetiracetam 1000mg - updated dose  Medications REMOVED:   none     The list below reflects the updated PTA list. Comments regarding how patient may be taking medications differently can be found in the Admit Orders Activity  Prior to Admission Medications   Prescriptions Last Dose Informant   levETIRAcetam (Keppra) 1,000 mg tablet  Self   Sig: Take 1 tablet (1,000 mg) by mouth 2 times a day.      Facility-Administered Medications: None        The list below reflects the updated allergy list. Please review each documented allergy for additional clarification and justification.  Allergies  Reviewed by Radha Michel on 5/6/2024   No Known Allergies         Pharmacy has been updated to [facility].    Sources used to complete the med history include dispense history, PTA medication list, patient interview. Patient is a good historian.    Below are additional concerns with the patient's PTA list.  Patient denies any OTC medications or additional prescription medication.    Radha Michel  Please reach out via [employer] Secure Chat for questions

## 2024-05-06 NOTE — CARE PLAN
The patient's goals for the shift include able to eat.    The clinical goals for the shift include stable neurologically, no  change  in mental status, no bleeding      Problem: General Stroke  Goal: Establish a mutual long term goal with patient by discharge  5/6/2024 1429 by Srikanth Gerber RN  Outcome: Progressing  5/6/2024 1429 by Srikanth Gerber RN  Outcome: Progressing  Goal: Demonstrate improvement in neurological exam throughout the shift  5/6/2024 1429 by Srikanth Gerber RN  Outcome: Progressing  5/6/2024 1429 by Srikanth Gerber RN  Outcome: Progressing  Goal: Maintain BP within ordered limits throughout shift  5/6/2024 1429 by Srikanth Gerber RN  Outcome: Progressing  5/6/2024 1429 by Srikanth Gerber RN  Outcome: Progressing  Goal: Participate in treatment (ie., meds, therapy) throughout shift  5/6/2024 1429 by Srikanht Gerber RN  Outcome: Progressing  5/6/2024 1429 by Srikanth Gerber RN  Outcome: Progressing  Goal: No symptoms of aspiration throughout shift  5/6/2024 1429 by Srikanth Gerber RN  Outcome: Progressing  5/6/2024 1429 by Srikanth Gerber RN  Outcome: Progressing  Goal: No symptoms of hemorrhage throughout shift  5/6/2024 1429 by Srikanth Gerber RN  Outcome: Progressing  5/6/2024 1429 by Srikanth Gerber RN  Outcome: Progressing     Problem: Pain - Adult  Goal: Verbalizes/displays adequate comfort level or baseline comfort level  5/6/2024 1429 by Srikanth Gerber RN  Outcome: Progressing  5/6/2024 1429 by Srikanth Gerber RN  Outcome: Progressing     Problem: Safety - Adult  Goal: Free from fall injury  5/6/2024 1429 by Srikanth Gerber RN  Outcome: Progressing  5/6/2024 1429 by Srikanth Gerber RN  Outcome: Progressing     Problem: Discharge Planning  Goal: Discharge to home or other facility with appropriate resources  5/6/2024 1429 by Srikanth Gerber RN  Outcome: Progressing  5/6/2024 1429 by Srikanth Gerber, RN  Outcome: Progressing     Problem: Chronic  Conditions and Co-morbidities  Goal: Patient's chronic conditions and co-morbidity symptoms are monitored and maintained or improved  5/6/2024 1429 by Srikanth Gerber RN  Outcome: Progressing  5/6/2024 1429 by Srikanth Gerber RN  Outcome: Progressing     Problem: Skin  Goal: Participates in plan/prevention/treatment measures  5/6/2024 1429 by Srikanth Gerber RN  Outcome: Progressing  Flowsheets (Taken 5/6/2024 1429)  Participates in plan/prevention/treatment measures:   Discuss with provider PT/OT consult   Elevate heels   Increase activity/out of bed for meals  5/6/2024 1429 by Srikanth Gerber RN  Outcome: Progressing  Flowsheets (Taken 5/6/2024 1429)  Participates in plan/prevention/treatment measures:   Discuss with provider PT/OT consult   Elevate heels   Increase activity/out of bed for meals  Goal: Prevent/manage excess moisture  5/6/2024 1429 by Srikanth Gerber RN  Outcome: Progressing  Flowsheets (Taken 5/6/2024 1429)  Prevent/manage excess moisture:   Cleanse incontinence/protect with barrier cream   Moisturize dry skin   Monitor for/manage infection if present  5/6/2024 1429 by Srikanth Gerber RN  Outcome: Progressing  Flowsheets (Taken 5/6/2024 1429)  Prevent/manage excess moisture:   Cleanse incontinence/protect with barrier cream   Moisturize dry skin   Monitor for/manage infection if present  Goal: Prevent/minimize sheer/friction injuries  5/6/2024 1429 by Srikanth Gerber RN  Outcome: Progressing  Flowsheets (Taken 5/6/2024 1429)  Prevent/minimize sheer/friction injuries:   Complete micro-shifts as needed if patient unable. Adjust patient position to relieve pressure points, not a full turn   HOB 30 degrees or less   Increase activity/out of bed for meals   Turn/reposition every 2 hours/use positioning/transfer devices   Use pull sheet   Utilize specialty bed per algorithm  5/6/2024 1429 by Srikanth Gerber RN  Outcome: Progressing  Flowsheets (Taken 5/6/2024 1429)  Prevent/minimize  sheer/friction injuries:   Complete micro-shifts as needed if patient unable. Adjust patient position to relieve pressure points, not a full turn   HOB 30 degrees or less   Increase activity/out of bed for meals   Turn/reposition every 2 hours/use positioning/transfer devices   Use pull sheet   Utilize specialty bed per algorithm  Goal: Promote/optimize nutrition  5/6/2024 1429 by Srikanth Gerber RN  Outcome: Progressing  Flowsheets (Taken 5/6/2024 1429)  Promote/optimize nutrition:   Monitor/record intake including meals   Consume > 50% meals/supplements  5/6/2024 1429 by Srikanth Gerber RN  Outcome: Progressing  Flowsheets (Taken 5/6/2024 1429)  Promote/optimize nutrition:   Monitor/record intake including meals   Consume > 50% meals/supplements     Problem: Fall/Injury  Goal: Not fall by end of shift  Outcome: Progressing  Goal: Be free from injury by end of the shift  Outcome: Progressing  Goal: Verbalize understanding of personal risk factors for fall in the hospital  Outcome: Progressing  Goal: Verbalize understanding of risk factor reduction measures to prevent injury from fall in the home  Outcome: Progressing  Goal: Use assistive devices by end of the shift  Outcome: Progressing  Goal: Pace activities to prevent fatigue by end of the shift  Outcome: Progressing

## 2024-05-06 NOTE — ED PROCEDURE NOTE
Procedure  Critical Care    Performed by: Jorge Luis Meredith DO  Authorized by: Jorge Luis Meredith DO    Critical care provider statement:     Critical care time (minutes):  30    Critical care time was exclusive of:  Separately billable procedures and treating other patients    Critical care was necessary to treat or prevent imminent or life-threatening deterioration of the following conditions:  CNS failure or compromise    Critical care was time spent personally by me on the following activities:  Ordering and review of radiographic studies, ordering and performing treatments and interventions, ordering and review of laboratory studies, re-evaluation of patient's condition, examination of patient, development of treatment plan with patient or surrogate and obtaining history from patient or surrogate               Jorge Luis Meredith DO  05/06/24 9524

## 2024-05-06 NOTE — CONSULTS
Neurology Consult    History Of Present Illness  58 y.o. female presenting with listed PMHX, coming for evaluation of  stroke-like sympt's. Hx is obtained from chart and patient.  Pat came to ED after developed at work (around 8:30 AM) Lt UE/LE weakness, Lt facial droop w/ some numbness along w/ difficulty getting words out.  Pat presented in stroke code within IV thrombolysis window which was given at ER.  No LVO suspected and CTA was not done at that time.  Pat feels significant improvement and don't feel any obvious significant deficits.  Pat denies assoc KEITH, n/v, vision changes, confusion, loc, cp, or palpitations  Mentions having hx of Sz's been tx w/ keppra w/ last Sz about 3 yrs ago w/ good AED compliance.  Mentions that on previous Sz's she had Lt side numbness and shakiness.  She has hx of stroke w/o significant residual defictis, but not on antiplatelets or anticoagulants.  Denies alcohol abuse or starting a recent med.      Past Medical History  Past Medical History:   Diagnosis Date    Seizures (Multi)      Surgical History  History reviewed. No pertinent surgical history.  Social History  Social History     Tobacco Use    Smoking status: Every Day     Current packs/day: 1.00     Types: Cigarettes   Vaping Use    Vaping status: Every Day     Allergies  Patient has no known allergies.  Medications Prior to Admission   Medication Sig Dispense Refill Last Dose    levETIRAcetam (Keppra) 1,000 mg tablet Take 1 tablet (1,000 mg) by mouth 2 times a day.   5/6/2024 at am       ROS: See hpi and H&P  Neurological Exam  Physical Exam  General:  lying in bed, alone, cooperative to the interview and physical exam, no distress, fair historian.    Head and Neck:  atraumatic; no carotids bruits audible bilaterally.  Mental Status:  Alert, oriented to person, place and time fully, follow simple and complex commands, no right to left confusion, normal speech fluency and comprehension, normal naming and repetition, normal  "fund of knowledge.     Cranial Nerves II-XII:  positive papillary reactivity to light and accommodation from 3mm to 2 mm bilaterally, normal extraocular movements, no visual field deficits to confrontational testing,  normal bilateral facial sensation to light touch at all quadrants, normal jaw muscle strength, normal bilateral facial grimace, normal hearing to finger rustles bilaterally, normal tongue protrusion and palate elevation, normal shoulder shrug.     Motor:  No drift in bilateral upper extremities, muscle strength (MRC score) seems to be is 5/5 in Rt side, strength in Lt UE is -5/5 UMN pattern; at Lt LE seem to be 5/5.  Coordination:  Normal finger to nose and rapid alternating movements at all ext's.     Reflexes:  +2/4 at all ext’s; plantar reflex is flexor bilaterally.   Sensory:  Normal light touch at all extremities.     Gait: deferred.      NIHSS: 0    Last Recorded Vitals  Blood pressure 105/66, pulse 67, temperature 36.7 °C (98.1 °F), temperature source Oral, resp. rate 25, height 1.575 m (5' 2.01\"), weight 94.6 kg (208 lb 8.9 oz), SpO2 94%.    Relevant Results  Results for orders placed or performed during the hospital encounter of 05/06/24 (from the past 96 hour(s))   ECG 12 lead   Result Value Ref Range    Ventricular Rate 71 BPM    Atrial Rate 71 BPM    OH Interval 196 ms    QRS Duration 80 ms    QT Interval 372 ms    QTC Calculation(Bazett) 404 ms    P Axis 117 degrees    R Axis -24 degrees    T Axis 118 degrees    QRS Count 11 beats    Q Onset 226 ms    P Onset 128 ms    P Offset 187 ms    T Offset 412 ms    QTC Fredericia 393 ms   CBC and Auto Differential   Result Value Ref Range    WBC 6.1 4.4 - 11.3 x10*3/uL    nRBC 0.0 0.0 - 0.0 /100 WBCs    RBC 4.24 4.00 - 5.20 x10*6/uL    Hemoglobin 13.4 12.0 - 16.0 g/dL    Hematocrit 41.4 36.0 - 46.0 %    MCV 98 80 - 100 fL    MCH 31.6 26.0 - 34.0 pg    MCHC 32.4 32.0 - 36.0 g/dL    RDW 12.8 11.5 - 14.5 %    Platelets 199 150 - 450 x10*3/uL    " Neutrophils % 58.0 40.0 - 80.0 %    Immature Granulocytes %, Automated 0.3 0.0 - 0.9 %    Lymphocytes % 33.5 13.0 - 44.0 %    Monocytes % 6.4 2.0 - 10.0 %    Eosinophils % 1.1 0.0 - 6.0 %    Basophils % 0.7 0.0 - 2.0 %    Neutrophils Absolute 3.53 1.20 - 7.70 x10*3/uL    Immature Granulocytes Absolute, Automated 0.02 0.00 - 0.70 x10*3/uL    Lymphocytes Absolute 2.04 1.20 - 4.80 x10*3/uL    Monocytes Absolute 0.39 0.10 - 1.00 x10*3/uL    Eosinophils Absolute 0.07 0.00 - 0.70 x10*3/uL    Basophils Absolute 0.04 0.00 - 0.10 x10*3/uL   Comprehensive metabolic panel   Result Value Ref Range    Glucose 111 (H) 65 - 99 mg/dL    Sodium 144 133 - 145 mmol/L    Potassium 3.6 3.4 - 5.1 mmol/L    Chloride 107 97 - 107 mmol/L    Bicarbonate 24 24 - 31 mmol/L    Urea Nitrogen 15 8 - 25 mg/dL    Creatinine 1.00 0.40 - 1.60 mg/dL    eGFR 65 >60 mL/min/1.73m*2    Calcium 9.1 8.5 - 10.4 mg/dL    Albumin 4.2 3.5 - 5.0 g/dL    Alkaline Phosphatase 52 35 - 125 U/L    Total Protein 7.6 5.9 - 7.9 g/dL    AST 15 5 - 40 U/L    Bilirubin, Total 0.4 0.1 - 1.2 mg/dL    ALT 13 5 - 40 U/L    Anion Gap 13 <=19 mmol/L   Troponin T, High Sensitivity   Result Value Ref Range    Troponin T, High Sensitivity 7 <=14 ng/L   Protime-INR   Result Value Ref Range    Protime 11.3 9.3 - 12.7 seconds    INR 1.1 0.9 - 1.2   APTT   Result Value Ref Range    aPTT 27.3 22.0 - 32.5 seconds   Hemoglobin A1c   Result Value Ref Range    Hemoglobin A1C 5.2 See below %    Estimated Average Glucose 103 Not Established mg/dL   Lipid panel   Result Value Ref Range    Cholesterol 189 133 - 200 mg/dL    HDL-Cholesterol 39.0 (L) >50.0 mg/dL    Cholesterol/HDL Ratio 4.8 SEE COMMENT    LDL Calculated 135 (H) 65 - 130 mg/dL    Triglycerides 73 40 - 150 mg/dL   POCT GLUCOSE   Result Value Ref Range    POCT Glucose 97 74 - 99 mg/dL   POCT glucose   Result Value Ref Range    POCT Glucose 97 74 - 99 mg/dL   POCT GLUCOSE   Result Value Ref Range    POCT Glucose 78 74 - 99 mg/dL    POCT GLUCOSE   Result Value Ref Range    POCT Glucose 93 74 - 99 mg/dL     CT angio head and neck w and wo IV contrast  Result Date: 5/6/2024  No flow-limiting stenosis or occlusion is identified within the head or neck with findings as above. MRI would be more sensitive and specific if recent ischemia were clinically suspected.   Please refer to separately dictated noncontrast CT of the head performed same day for respective intracranial findings.   I personally reviewed the images/study and I agree with the findings as stated by Dr. Parks.       ECG 12 lead  Result Date: 5/6/2024  Sinus rhythm with Premature ventricular complexes or Fusion complexes Cannot rule out Anterior infarct , age undetermined ST & T wave abnormality, consider lateral ischemia Abnormal ECG artifact When compared with ECG of 06-MAY-2024 09:29, (unconfirmed) Previous ECG has undetermined rhythm, needs review Confirmed by Fernie Flores ([id number]) on 5/6/2024 5:08:59 PM    CT brain attack head wo IV contrast  Result Date: 5/6/2024  No acute intracranial hemorrhage, mass effect, or CT apparent acute infarct. Stable remote large right MCA distribution infarct.   Johnny Haro discussed the significance and urgency of this critical finding by telephone with  RAFFAELE LOPEZ on 5/6/2024 at 10:07 am.  (**-RCF-**) Findings:  See findings.               Assessment/Plan   Impression:  Stroke-like sympt's of exact etiology unknown.  Have hx of Sz's, but hx not well supportive of current sympt's to be related to a seizure.  Received IV thrombolysis w/o complications, so far.  CTA's w/o LVO not indicating need for further vascular intervention.  Will need to cont post-IV thrombolysis protocol and eventual stroke prophylactic tx.      Recommendations/Plan:  Cont post-IV thrombolysis protocol (ICU monitoring, CT brain 24 hrs post TNK and no antiplatelets or anticoagulants in 24 hrs post TNK).  Check MRI brain w/o cont.   Check b12, tsh.     4.   Telemetry  and Echo (as per primary medical team).   5.   Control of vascular risk factors, statin, avoid hypotension, BP Parameters: SBP>120<170; DBP>60<90.    6.   Cont keppra 1,000mg PO Q12 hrs.   7.   Get PT/OT and follow ST rec's.     8.   DVT prophylaxis w/ SCD's only.    9.   Follow to cont.       Mike Mckenna MD

## 2024-05-06 NOTE — PROGRESS NOTES
Speech-Language Pathology    Speech-Language Pathology Clinical Swallow Evaluation    Patient Name: Lexy Dunaway  MRN: [id number]  : 1965  Today's Date: 24  Start time: Start Time:   Stop time: Stop Time: 1426  Time calculation (min) : Time Calculation (min): 11 min      ASSESSMENT  Impressions:   oral phase WFL no suspected pharyngeal phase dysphagia based on clinical swallow evaluation.  Prognosis: Good    PLAN  Recommendations:  MBSS recommended: No; no pharyngeal dysphagia suspected.  Solid consistency: Regular (IDDSI level 7)  Liquid consistency: Thin (IDDSI 0)  Medication administration: Whole, in thin liquid    Recommended frequency/duration:  Skilled SLP services recommended: No  Frequency: N/A  Duration: N/A  Discharge recommendation: Home with no further SLP     Strengths: Cognition and Motivation  Barriers to participation in tx: N/A    SUBJECTIVE    PMHx relevant to rehab: h/o CVA    Chief complaint: Pt was admitted on 24 due to presenting with acute left sided weakness and difficulty with word finding. Her symptoms have normalized now with an NIH of 0. She is admitted to the ICU for post CVA thrombolytic monitoring and care.    Relevant imaging results: CT brain 24:  IMPRESSION:  No acute intracranial hemorrhage, mass effect, or CT apparent acute  infarct. Stable remote large right MCA distribution infarct.    General Visit Information:     Patient Class: Inpatient  Living Environment: Home     Reason for Referral: assess swallow per CVA protocol  Prior to Session Communication: Bedside nurse    RN cleared pt to participate in session and reported passed NSS    Pt reported no history or current s/s dysphagia, speech difficulty resolved as well    Date of Onset: 24  Date of Order: 24  [employer] Diet: regular and thin liquids  Current Diet : regular and thin liquids    Pain Assessment  Pain Assessment: 0-10  Pain Score: 0 - No pain    Pt was alert, pleasant, and  cooperative for session.  Orientation: Oriented to situation and Ox4  Ability to follow functional commands: WFL  Nutritional status: Appears well-nourished/no concerns    Respiratory status: Room air  Baseline Vocal Quality: Normal  Volitional Cough: Strong  Volitional Swallow: Within Functional Limits  Patient positioning: Upright in bed      OBJECTIVE  Clinical swallow evaluation completed and consisted of interview, oral motor assessment, and PO trials (6 oz thin liquids, 1 priyanka cracker).  ORAL PHASE: Natural lower dentition in good condition, upper dentures present. Oral mucosa were pink, moist, and free of obvious lesions. Lingual strength and ROM were WFL. Labial strength/ROM were WFL. Labial seal was adequate. Mastication of regular solids was WFL A/P transit and oral clearance were adequate.  PHARYNGEAL PHASE: Laryngeal elevation was visualized or palpated with all trials, however adequacy of hyolaryngeal elevation/excursion cannot be determined at bedside. No immediate or delayed s/sx aspiration/penetration were observed with any consistencies.    Was 3oz challenge administered: Yes; pt drank 3oz of thin liquid in one attempt, without breaking, with no overt s/sx aspiration/penetration observed.      Treatment/Education:  Results and recommendations were relayed to: Patient and Bedside nurse  Education provided: Yes   Learner: Patient   Barriers to learning: None   Method of teaching: Verbal   Topic: role of ST and results of assessment   Outcome of teaching: Pt verbalized understanding  Treatment provided: No

## 2024-05-07 ENCOUNTER — APPOINTMENT (OUTPATIENT)
Dept: RADIOLOGY | Facility: HOSPITAL | Age: 59
End: 2024-05-07

## 2024-05-07 LAB
ANION GAP SERPL CALC-SCNC: 11 MMOL/L
BUN SERPL-MCNC: 18 MG/DL (ref 8–25)
CALCIUM SERPL-MCNC: 8.6 MG/DL (ref 8.5–10.4)
CHLORIDE SERPL-SCNC: 108 MMOL/L (ref 97–107)
CO2 SERPL-SCNC: 23 MMOL/L (ref 24–31)
CREAT SERPL-MCNC: 0.9 MG/DL (ref 0.4–1.6)
EGFRCR SERPLBLD CKD-EPI 2021: 74 ML/MIN/1.73M*2
ERYTHROCYTE [DISTWIDTH] IN BLOOD BY AUTOMATED COUNT: 12.8 % (ref 11.5–14.5)
GLUCOSE BLD MANUAL STRIP-MCNC: 107 MG/DL (ref 74–99)
GLUCOSE BLD MANUAL STRIP-MCNC: 72 MG/DL (ref 74–99)
GLUCOSE BLD MANUAL STRIP-MCNC: 84 MG/DL (ref 74–99)
GLUCOSE BLD MANUAL STRIP-MCNC: 89 MG/DL (ref 74–99)
GLUCOSE BLD MANUAL STRIP-MCNC: 89 MG/DL (ref 74–99)
GLUCOSE SERPL-MCNC: 93 MG/DL (ref 65–99)
HCT VFR BLD AUTO: 36.8 % (ref 36–46)
HGB BLD-MCNC: 11.9 G/DL (ref 12–16)
MAGNESIUM SERPL-MCNC: 2.1 MG/DL (ref 1.6–3.1)
MCH RBC QN AUTO: 32 PG (ref 26–34)
MCHC RBC AUTO-ENTMCNC: 32.3 G/DL (ref 32–36)
MCV RBC AUTO: 99 FL (ref 80–100)
NRBC BLD-RTO: 0 /100 WBCS (ref 0–0)
PLATELET # BLD AUTO: 180 X10*3/UL (ref 150–450)
POTASSIUM SERPL-SCNC: 3.9 MMOL/L (ref 3.4–5.1)
RBC # BLD AUTO: 3.72 X10*6/UL (ref 4–5.2)
SODIUM SERPL-SCNC: 142 MMOL/L (ref 133–145)
WBC # BLD AUTO: 5.2 X10*3/UL (ref 4.4–11.3)

## 2024-05-07 PROCEDURE — 83735 ASSAY OF MAGNESIUM: CPT

## 2024-05-07 PROCEDURE — 97116 GAIT TRAINING THERAPY: CPT | Mod: GP

## 2024-05-07 PROCEDURE — 2500000001 HC RX 250 WO HCPCS SELF ADMINISTERED DRUGS (ALT 637 FOR MEDICARE OP): Performed by: NURSE PRACTITIONER

## 2024-05-07 PROCEDURE — 36415 COLL VENOUS BLD VENIPUNCTURE: CPT

## 2024-05-07 PROCEDURE — 80048 BASIC METABOLIC PNL TOTAL CA: CPT

## 2024-05-07 PROCEDURE — 97161 PT EVAL LOW COMPLEX 20 MIN: CPT | Mod: GP

## 2024-05-07 PROCEDURE — 85027 COMPLETE CBC AUTOMATED: CPT

## 2024-05-07 PROCEDURE — 97165 OT EVAL LOW COMPLEX 30 MIN: CPT | Mod: GO

## 2024-05-07 PROCEDURE — 99291 CRITICAL CARE FIRST HOUR: CPT | Performed by: NURSE PRACTITIONER

## 2024-05-07 PROCEDURE — 70450 CT HEAD/BRAIN W/O DYE: CPT

## 2024-05-07 PROCEDURE — 82947 ASSAY GLUCOSE BLOOD QUANT: CPT

## 2024-05-07 PROCEDURE — 2500000004 HC RX 250 GENERAL PHARMACY W/ HCPCS (ALT 636 FOR OP/ED): Performed by: NURSE PRACTITIONER

## 2024-05-07 PROCEDURE — 2500000002 HC RX 250 W HCPCS SELF ADMINISTERED DRUGS (ALT 637 FOR MEDICARE OP, ALT 636 FOR OP/ED): Performed by: NURSE PRACTITIONER

## 2024-05-07 PROCEDURE — 1200000002 HC GENERAL ROOM WITH TELEMETRY DAILY

## 2024-05-07 PROCEDURE — 2500000006 HC RX 250 W HCPCS SELF ADMINISTERED DRUGS (ALT 637 FOR ALL PAYERS)

## 2024-05-07 PROCEDURE — 2500000001 HC RX 250 WO HCPCS SELF ADMINISTERED DRUGS (ALT 637 FOR MEDICARE OP): Performed by: PSYCHIATRY & NEUROLOGY

## 2024-05-07 PROCEDURE — 70450 CT HEAD/BRAIN W/O DYE: CPT | Performed by: RADIOLOGY

## 2024-05-07 PROCEDURE — S4991 NICOTINE PATCH NONLEGEND: HCPCS | Performed by: NURSE PRACTITIONER

## 2024-05-07 PROCEDURE — 97530 THERAPEUTIC ACTIVITIES: CPT | Mod: GO

## 2024-05-07 RX ORDER — ACETAMINOPHEN 325 MG/1
650 TABLET ORAL EVERY 6 HOURS PRN
Status: DISCONTINUED | OUTPATIENT
Start: 2024-05-07 | End: 2024-05-08 | Stop reason: HOSPADM

## 2024-05-07 RX ORDER — LEVETIRACETAM 500 MG/1
1000 TABLET ORAL DAILY
Status: DISCONTINUED | OUTPATIENT
Start: 2024-05-08 | End: 2024-05-08 | Stop reason: HOSPADM

## 2024-05-07 RX ORDER — POTASSIUM CHLORIDE 20 MEQ/1
20 TABLET, EXTENDED RELEASE ORAL ONCE
Status: COMPLETED | OUTPATIENT
Start: 2024-05-07 | End: 2024-05-07

## 2024-05-07 RX ORDER — LEVETIRACETAM 250 MG/1
250 TABLET ORAL NIGHTLY
Status: DISCONTINUED | OUTPATIENT
Start: 2024-05-07 | End: 2024-05-07

## 2024-05-07 RX ORDER — LANOLIN ALCOHOL/MO/W.PET/CERES
1000 CREAM (GRAM) TOPICAL DAILY
Status: DISCONTINUED | OUTPATIENT
Start: 2024-05-07 | End: 2024-05-08 | Stop reason: HOSPADM

## 2024-05-07 RX ADMIN — ENOXAPARIN SODIUM 40 MG: 40 INJECTION SUBCUTANEOUS at 12:23

## 2024-05-07 RX ADMIN — ATORVASTATIN CALCIUM 80 MG: 80 TABLET, FILM COATED ORAL at 21:18

## 2024-05-07 RX ADMIN — Medication 1 PATCH: at 09:38

## 2024-05-07 RX ADMIN — LEVETIRACETAM 1000 MG: 500 TABLET, FILM COATED ORAL at 09:38

## 2024-05-07 RX ADMIN — Medication 1000 MCG: at 12:23

## 2024-05-07 RX ADMIN — POTASSIUM CHLORIDE 20 MEQ: 1500 TABLET, EXTENDED RELEASE ORAL at 05:15

## 2024-05-07 RX ADMIN — LEVETIRACETAM 1250 MG: 500 TABLET, FILM COATED ORAL at 21:18

## 2024-05-07 SDOH — HEALTH STABILITY: MENTAL HEALTH: HOW MANY STANDARD DRINKS CONTAINING ALCOHOL DO YOU HAVE ON A TYPICAL DAY?: PATIENT DOES NOT DRINK

## 2024-05-07 SDOH — SOCIAL STABILITY: SOCIAL INSECURITY: WITHIN THE LAST YEAR, HAVE YOU BEEN HUMILIATED OR EMOTIONALLY ABUSED IN OTHER WAYS BY YOUR PARTNER OR EX-PARTNER?: NO

## 2024-05-07 SDOH — ECONOMIC STABILITY: TRANSPORTATION INSECURITY: IN THE PAST 12 MONTHS, HAS LACK OF TRANSPORTATION KEPT YOU FROM MEDICAL APPOINTMENTS OR FROM GETTING MEDICATIONS?: NO

## 2024-05-07 SDOH — SOCIAL STABILITY: SOCIAL INSECURITY: WITHIN THE LAST YEAR, HAVE YOU BEEN AFRAID OF YOUR PARTNER OR EX-PARTNER?: NO

## 2024-05-07 SDOH — ECONOMIC STABILITY: HOUSING INSECURITY: IN THE LAST 12 MONTHS, WAS THERE A TIME WHEN YOU WERE NOT ABLE TO PAY THE MORTGAGE OR RENT ON TIME?: NO

## 2024-05-07 SDOH — ECONOMIC STABILITY: HOUSING INSECURITY: IN THE LAST 12 MONTHS, HOW MANY PLACES HAVE YOU LIVED?: 1

## 2024-05-07 SDOH — HEALTH STABILITY: MENTAL HEALTH: HOW OFTEN DO YOU HAVE SIX OR MORE DRINKS ON ONE OCCASION?: NEVER

## 2024-05-07 SDOH — ECONOMIC STABILITY: INCOME INSECURITY: IN THE PAST 12 MONTHS HAS THE ELECTRIC, GAS, OIL, OR WATER COMPANY THREATENED TO SHUT OFF SERVICES IN YOUR HOME?: NO

## 2024-05-07 SDOH — SOCIAL STABILITY: SOCIAL NETWORK: HOW OFTEN DO YOU ATTEND CHURCH OR RELIGIOUS SERVICES?: NEVER

## 2024-05-07 SDOH — ECONOMIC STABILITY: FOOD INSECURITY: WITHIN THE PAST 12 MONTHS, YOU WORRIED THAT YOUR FOOD WOULD RUN OUT BEFORE YOU GOT MONEY TO BUY MORE.: NEVER TRUE

## 2024-05-07 SDOH — HEALTH STABILITY: PHYSICAL HEALTH
HOW OFTEN DO YOU NEED TO HAVE SOMEONE HELP YOU WHEN YOU READ INSTRUCTIONS, PAMPHLETS, OR OTHER WRITTEN MATERIAL FROM YOUR DOCTOR OR PHARMACY?: NEVER

## 2024-05-07 SDOH — SOCIAL STABILITY: SOCIAL INSECURITY: ARE YOU MARRIED, WIDOWED, DIVORCED, SEPARATED, NEVER MARRIED, OR LIVING WITH A PARTNER?: WIDOWED

## 2024-05-07 SDOH — HEALTH STABILITY: PHYSICAL HEALTH: ON AVERAGE, HOW MANY MINUTES DO YOU ENGAGE IN EXERCISE AT THIS LEVEL?: 30 MIN

## 2024-05-07 SDOH — ECONOMIC STABILITY: FOOD INSECURITY: WITHIN THE PAST 12 MONTHS, THE FOOD YOU BOUGHT JUST DIDN'T LAST AND YOU DIDN'T HAVE MONEY TO GET MORE.: NEVER TRUE

## 2024-05-07 SDOH — ECONOMIC STABILITY: FOOD INSECURITY: HOW HARD IS IT FOR YOU TO PAY FOR THE VERY BASICS LIKE FOOD, HOUSING, MEDICAL CARE, AND HEATING?: NOT VERY HARD

## 2024-05-07 SDOH — HEALTH STABILITY: MENTAL HEALTH: HOW MANY DRINKS CONTAINING ALCOHOL DO YOU HAVE ON A TYPICAL DAY WHEN YOU ARE DRINKING?: PATIENT DOES NOT DRINK

## 2024-05-07 SDOH — HEALTH STABILITY: MENTAL HEALTH: HOW OFTEN DO YOU HAVE A DRINK CONTAINING ALCOHOL?: NEVER

## 2024-05-07 SDOH — SOCIAL STABILITY: SOCIAL NETWORK: ARE YOU MARRIED, WIDOWED, DIVORCED, SEPARATED, NEVER MARRIED, OR LIVING WITH A PARTNER?: WIDOWED

## 2024-05-07 SDOH — HEALTH STABILITY: MENTAL HEALTH: HOW OFTEN DO YOU HAVE 6 OR MORE DRINKS ON ONE OCCASION?: NEVER

## 2024-05-07 SDOH — HEALTH STABILITY: MENTAL HEALTH
DO YOU FEEL STRESS - TENSE, RESTLESS, NERVOUS, OR ANXIOUS, OR UNABLE TO SLEEP AT NIGHT BECAUSE YOUR MIND IS TROUBLED ALL THE TIME - THESE DAYS?: ONLY A LITTLE

## 2024-05-07 SDOH — ECONOMIC STABILITY: INCOME INSECURITY: IN THE PAST 12 MONTHS, HAS THE ELECTRIC, GAS, OIL, OR WATER COMPANY THREATENED TO SHUT OFF SERVICE IN YOUR HOME?: NO

## 2024-05-07 SDOH — SOCIAL STABILITY: SOCIAL NETWORK: HOW OFTEN DO YOU GET TOGETHER WITH FRIENDS OR RELATIVES?: ONCE A WEEK

## 2024-05-07 SDOH — ECONOMIC STABILITY: INCOME INSECURITY: HOW HARD IS IT FOR YOU TO PAY FOR THE VERY BASICS LIKE FOOD, HOUSING, MEDICAL CARE, AND HEATING?: NOT VERY HARD

## 2024-05-07 SDOH — ECONOMIC STABILITY: INCOME INSECURITY: IN THE LAST 12 MONTHS, WAS THERE A TIME WHEN YOU WERE NOT ABLE TO PAY THE MORTGAGE OR RENT ON TIME?: NO

## 2024-05-07 SDOH — SOCIAL STABILITY: SOCIAL NETWORK: HOW OFTEN DO YOU ATTEND MEETINGS OF THE CLUBS OR ORGANIZATIONS YOU BELONG TO?: NEVER

## 2024-05-07 SDOH — HEALTH STABILITY: MENTAL HEALTH
STRESS IS WHEN SOMEONE FEELS TENSE, NERVOUS, ANXIOUS, OR CAN'T SLEEP AT NIGHT BECAUSE THEIR MIND IS TROUBLED. HOW STRESSED ARE YOU?: ONLY A LITTLE

## 2024-05-07 SDOH — ECONOMIC STABILITY: FOOD INSECURITY: WITHIN THE PAST 12 MONTHS, YOU WORRIED THAT YOUR FOOD WOULD RUN OUT BEFORE YOU GOT THE MONEY TO BUY MORE.: NEVER TRUE

## 2024-05-07 SDOH — HEALTH STABILITY: PHYSICAL HEALTH: ON AVERAGE, HOW MANY DAYS PER WEEK DO YOU ENGAGE IN MODERATE TO STRENUOUS EXERCISE (LIKE A BRISK WALK)?: 4 DAYS

## 2024-05-07 SDOH — SOCIAL STABILITY: SOCIAL NETWORK: HOW OFTEN DO YOU ATTENT MEETINGS OF THE CLUB OR ORGANIZATION YOU BELONG TO?: NEVER

## 2024-05-07 ASSESSMENT — PAIN - FUNCTIONAL ASSESSMENT
PAIN_FUNCTIONAL_ASSESSMENT: 0-10

## 2024-05-07 ASSESSMENT — COGNITIVE AND FUNCTIONAL STATUS - GENERAL
MOBILITY SCORE: 24
DAILY ACTIVITIY SCORE: 24
DAILY ACTIVITIY SCORE: 24
MOBILITY SCORE: 24

## 2024-05-07 ASSESSMENT — PAIN SCALES - WONG BAKER: WONGBAKER_NUMERICALRESPONSE: NO HURT

## 2024-05-07 ASSESSMENT — PAIN SCALES - GENERAL
PAINLEVEL_OUTOF10: 0 - NO PAIN

## 2024-05-07 ASSESSMENT — ACTIVITIES OF DAILY LIVING (ADL)
LACK_OF_TRANSPORTATION: NO
BATHING_ASSISTANCE: INDEPENDENT
LACK_OF_TRANSPORTATION: NO
ADL_ASSISTANCE: INDEPENDENT

## 2024-05-07 ASSESSMENT — LIFESTYLE VARIABLES
SKIP TO QUESTIONS 9-10: 1
AUDIT-C TOTAL SCORE: 0

## 2024-05-07 NOTE — CARE PLAN
The patient's goals for the shift include able to eat.    The clinical goals for the shift include stable neurologically, no  change  in mental status, no bleeding      Problem: General Stroke  Goal: Establish a mutual long term goal with patient by discharge  Outcome: Progressing  Goal: Demonstrate improvement in neurological exam throughout the shift  Outcome: Progressing  Goal: Maintain BP within ordered limits throughout shift  Outcome: Progressing  Goal: Participate in treatment (ie., meds, therapy) throughout shift  Outcome: Progressing  Goal: No symptoms of aspiration throughout shift  Outcome: Progressing  Goal: No symptoms of hemorrhage throughout shift  Outcome: Progressing     Problem: Pain - Adult  Goal: Verbalizes/displays adequate comfort level or baseline comfort level  Outcome: Progressing

## 2024-05-07 NOTE — CARE PLAN
The patient's goals for the shift include able to eat.    The clinical goals for the shift include stable neurologically, no  change  in mental status, no bleeding      Problem: General Stroke  Goal: Establish a mutual long term goal with patient by discharge  5/6/2024 2053 by Andrei Mcclelland RN  Outcome: Progressing  5/6/2024 2053 by Andrei Mcclelland RN  Outcome: Progressing  Goal: Demonstrate improvement in neurological exam throughout the shift  5/6/2024 2053 by Andrei Mcclelland RN  Outcome: Progressing  5/6/2024 2053 by Andrei Mcclelland RN  Outcome: Progressing  Goal: Maintain BP within ordered limits throughout shift  5/6/2024 2053 by Andrei Mcclelland RN  Outcome: Progressing  5/6/2024 2053 by Andrei Mcclelland RN  Outcome: Progressing  Goal: Participate in treatment (ie., meds, therapy) throughout shift  5/6/2024 2053 by Andrei Mcclelland RN  Outcome: Progressing  5/6/2024 2053 by Andrei Mcclelland RN  Outcome: Progressing  Goal: No symptoms of aspiration throughout shift  5/6/2024 2053 by Andrei Mcclelland RN  Outcome: Progressing  5/6/2024 2053 by Andrei Mcclelland RN  Outcome: Progressing  Goal: No symptoms of hemorrhage throughout shift  5/6/2024 2053 by Andrei Mcclelland RN  Outcome: Progressing  5/6/2024 2053 by Andrei Mcclelland RN  Outcome: Progressing     Problem: Pain - Adult  Goal: Verbalizes/displays adequate comfort level or baseline comfort level  5/6/2024 2053 by Andrei Mcclelland RN  Outcome: Progressing  5/6/2024 2053 by Andrei Mcclelland RN  Outcome: Progressing     Problem: Safety - Adult  Goal: Free from fall injury  5/6/2024 2053 by Andrei Mcclelland RN  Outcome: Progressing  5/6/2024 2053 by Andrei Mcclelland RN  Outcome: Progressing     Problem: Chronic Conditions and Co-morbidities  Goal: Patient's chronic conditions and co-morbidity symptoms are monitored and maintained or improved  5/6/2024 2053 by Andrei Mcclelland RN  Outcome: Progressing  5/6/2024 2053 by Andrei Mcclelland  RN  Outcome: Progressing     Problem: Skin  Goal: Participates in plan/prevention/treatment measures  5/6/2024 2053 by Andrei Mcclelland RN  Outcome: Progressing  Flowsheets (Taken 5/6/2024 1429 by Srikanth Gerber, RN)  Participates in plan/prevention/treatment measures:   Discuss with provider PT/OT consult   Elevate heels   Increase activity/out of bed for meals  5/6/2024 2053 by Andrei Mcclelland RN  Outcome: Progressing  Goal: Prevent/manage excess moisture  5/6/2024 2053 by Andrei Mcclelland RN  Outcome: Progressing  Flowsheets (Taken 5/6/2024 1429 by Srikanth Gerber RN)  Prevent/manage excess moisture:   Cleanse incontinence/protect with barrier cream   Moisturize dry skin   Monitor for/manage infection if present  5/6/2024 2053 by Andrei Mcclelland RN  Outcome: Progressing  Goal: Prevent/minimize sheer/friction injuries  5/6/2024 2053 by Andrei Mcclelland RN  Outcome: Progressing  Flowsheets (Taken 5/6/2024 1429 by Srikanth Gerber RN)  Prevent/minimize sheer/friction injuries:   Complete micro-shifts as needed if patient unable. Adjust patient position to relieve pressure points, not a full turn   HOB 30 degrees or less   Increase activity/out of bed for meals   Turn/reposition every 2 hours/use positioning/transfer devices   Use pull sheet   Utilize specialty bed per algorithm  5/6/2024 2053 by Andrei Mcclelland RN  Outcome: Progressing  Goal: Promote/optimize nutrition  5/6/2024 2053 by Andrei Mcclelland RN  Outcome: Progressing  Flowsheets (Taken 5/6/2024 1429 by Srikanth Gerber RN)  Promote/optimize nutrition:   Monitor/record intake including meals   Consume > 50% meals/supplements  5/6/2024 2053 by Andrei Mcclelland RN  Outcome: Progressing     Problem: Fall/Injury  Goal: Not fall by end of shift  5/6/2024 2053 by Andrei Mcclelland RN  Outcome: Progressing  5/6/2024 2053 by Andrei Mcclelland RN  Outcome: Progressing  Goal: Be free from injury by end of the shift  5/6/2024 2053 by Andrei BARKER  AIRAM Mcclelland  Outcome: Progressing  5/6/2024 2053 by Andrei Mcclelland RN  Outcome: Progressing  Goal: Verbalize understanding of personal risk factors for fall in the hospital  5/6/2024 2053 by Andrei Mcclelland RN  Outcome: Progressing  5/6/2024 2053 by Andrei Mcclelland RN  Outcome: Progressing  Goal: Verbalize understanding of risk factor reduction measures to prevent injury from fall in the home  5/6/2024 2053 by Andrei Mcclelland RN  Outcome: Progressing  5/6/2024 2053 by Andrei Mcclelland RN  Outcome: Progressing  Goal: Use assistive devices by end of the shift  5/6/2024 2053 by Andrei Mcclelland RN  Outcome: Progressing  5/6/2024 2053 by Andrei Mcclelland, RN  Outcome: Progressing  Goal: Pace activities to prevent fatigue by end of the shift  5/6/2024 2053 by Andrei Mcclelland RN  Outcome: Progressing  5/6/2024 2053 by Andrei Mcclelland RN  Outcome: Progressing

## 2024-05-07 NOTE — PROGRESS NOTES
TCC met with patient. Assessment complete. Patient lives with her sister. Patient is independent. Patient is not allowed to drive. Patient able to shop, cook and clean. Patient vapes. Patient does not have a PCP but follows with Dr. Davy Mckeon for neurology. Patient fills prescriptions at [facility] in [place]. Patient does not have health insurance at this time, Annie Villavicencio notified. At this time the discharge plan is to return home. Will follow.      05/07/24 8001   Discharge Planning   Living Arrangements Family members   Support Systems Family members   Type of Residence Private residence   Home or Post Acute Services None   Patient expects to be discharged to: Home   Does the patient need discharge transport arranged? No   Has discharge transport been arranged? No   Financial Resource Strain   How hard is it for you to pay for the very basics like food, housing, medical care, and heating? Not very   Housing Stability   In the last 12 months, was there a time when you were not able to pay the mortgage or rent on time? N   In the last 12 months, how many places have you lived? 1   In the last 12 months, was there a time when you did not have a steady place to sleep or slept in a shelter (including now)? N   Transportation Needs   In the past 12 months, has lack of transportation kept you from medical appointments or from getting medications? no   In the past 12 months, has lack of transportation kept you from meetings, work, or from getting things needed for daily living? No

## 2024-05-07 NOTE — PROGRESS NOTES
"Neurology Progress Note     Subjective   59 y/o F been eval for Stroke-like sympt's.  Pat cont clinically stable and back to baseline.  Denies having Sz recurrence while in hospital.  Denies HA, new vision/speech changes.  On further questioning,  mentions to felt a mild vibration feeling in Lt UE before other sympt's that brought her to ER.  Mentions felted similar to a previous Sz event in the past.  She is been compliant w/ keppra and denies significant SE's.       Scheduled medications  [Held by provider] aspirin, 81 mg, oral, Daily  atorvastatin, 80 mg, oral, Nightly  [Held by provider] enoxaparin, 40 mg, subcutaneous, q24h  insulin regular, 0-5 Units, subcutaneous, TID with meals  levETIRAcetam, 1,000 mg, oral, BID  nicotine, 1 patch, transdermal, Daily   Followed by  [START ON 6/17/2024] nicotine, 1 patch, transdermal, Daily  polyethylene glycol, 17 g, oral, Daily    PRN medications  PRN medications: dextrose, dextrose, glucagon, glucagon, hydrALAZINE **FOLLOWED BY** [START ON 5/8/2024] hydrALAZINE, labetaloL, oxygen      Objective     Last Recorded Vitals  Blood pressure 118/72, pulse 56, temperature 36.8 °C (98.2 °F), temperature source Temporal, resp. rate 16, height 1.575 m (5' 2.01\"), weight 94.6 kg (208 lb 8.9 oz), SpO2 96%.    Physical Exam  Neurological Exam  General:  lying in bed, alone, cooperative, no distress, good historian.    Mental Status:  Alert, oriented to person, place and time fully, follow simple and complex commands, no right to left confusion, normal speech fluency and comprehension, normal naming and repetition, normal fund of knowledge.     Cranial Nerves II-XII: (+) perrl 3mm to 2 mm bilaterally, normal eom’s; no visual field deficits to confrontational testing, normal bilateral facial sensation to light touch at all quadrants, normal jaw muscle strength, normal bilateral facial grimace, normal hearing to conversation; normal tongue protrusion and palate elevation, normal shoulder " shrug.      Motor:  No drift in bilateral upper extremities, muscle strength (MRC score) is 5/5 at all extremities, normal tone.   Coordination:  Normal ftn and hts, normal lauren’s to finger tap and foot tap bilaterally.     Reflexes:  +2/4 at all ext’s; plantar reflex is flexor bilaterally.   Sensory:  Normal light touch, vibration and cold perception at all extremities; negative Romberg’s sign.     Gait:  deferred.    Relevant Results  Results for orders placed or performed during the hospital encounter of 05/06/24 (from the past 96 hour(s))   ECG 12 lead   Result Value Ref Range    Ventricular Rate 71 BPM    Atrial Rate 71 BPM    ID Interval 196 ms    QRS Duration 80 ms    QT Interval 372 ms    QTC Calculation(Bazett) 404 ms    P Axis 117 degrees    R Axis -24 degrees    T Axis 118 degrees    QRS Count 11 beats    Q Onset 226 ms    P Onset 128 ms    P Offset 187 ms    T Offset 412 ms    QTC Fredericia 393 ms   CBC and Auto Differential   Result Value Ref Range    WBC 6.1 4.4 - 11.3 x10*3/uL    nRBC 0.0 0.0 - 0.0 /100 WBCs    RBC 4.24 4.00 - 5.20 x10*6/uL    Hemoglobin 13.4 12.0 - 16.0 g/dL    Hematocrit 41.4 36.0 - 46.0 %    MCV 98 80 - 100 fL    MCH 31.6 26.0 - 34.0 pg    MCHC 32.4 32.0 - 36.0 g/dL    RDW 12.8 11.5 - 14.5 %    Platelets 199 150 - 450 x10*3/uL    Neutrophils % 58.0 40.0 - 80.0 %    Immature Granulocytes %, Automated 0.3 0.0 - 0.9 %    Lymphocytes % 33.5 13.0 - 44.0 %    Monocytes % 6.4 2.0 - 10.0 %    Eosinophils % 1.1 0.0 - 6.0 %    Basophils % 0.7 0.0 - 2.0 %    Neutrophils Absolute 3.53 1.20 - 7.70 x10*3/uL    Immature Granulocytes Absolute, Automated 0.02 0.00 - 0.70 x10*3/uL    Lymphocytes Absolute 2.04 1.20 - 4.80 x10*3/uL    Monocytes Absolute 0.39 0.10 - 1.00 x10*3/uL    Eosinophils Absolute 0.07 0.00 - 0.70 x10*3/uL    Basophils Absolute 0.04 0.00 - 0.10 x10*3/uL   Comprehensive metabolic panel   Result Value Ref Range    Glucose 111 (H) 65 - 99 mg/dL    Sodium 144 133 - 145 mmol/L     Potassium 3.6 3.4 - 5.1 mmol/L    Chloride 107 97 - 107 mmol/L    Bicarbonate 24 24 - 31 mmol/L    Urea Nitrogen 15 8 - 25 mg/dL    Creatinine 1.00 0.40 - 1.60 mg/dL    eGFR 65 >60 mL/min/1.73m*2    Calcium 9.1 8.5 - 10.4 mg/dL    Albumin 4.2 3.5 - 5.0 g/dL    Alkaline Phosphatase 52 35 - 125 U/L    Total Protein 7.6 5.9 - 7.9 g/dL    AST 15 5 - 40 U/L    Bilirubin, Total 0.4 0.1 - 1.2 mg/dL    ALT 13 5 - 40 U/L    Anion Gap 13 <=19 mmol/L   Troponin T, High Sensitivity   Result Value Ref Range    Troponin T, High Sensitivity 7 <=14 ng/L   Protime-INR   Result Value Ref Range    Protime 11.3 9.3 - 12.7 seconds    INR 1.1 0.9 - 1.2   APTT   Result Value Ref Range    aPTT 27.3 22.0 - 32.5 seconds   Hemoglobin A1c   Result Value Ref Range    Hemoglobin A1C 5.2 See below %    Estimated Average Glucose 103 Not Established mg/dL   Lipid panel   Result Value Ref Range    Cholesterol 189 133 - 200 mg/dL    HDL-Cholesterol 39.0 (L) >50.0 mg/dL    Cholesterol/HDL Ratio 4.8 SEE COMMENT    LDL Calculated 135 (H) 65 - 130 mg/dL    Triglycerides 73 40 - 150 mg/dL   Vitamin B12   Result Value Ref Range    Vitamin B12 238 211 - 946 pg/mL   TSH   Result Value Ref Range    Thyroid Stimulating Hormone 1.03 0.27 - 4.20 mIU/L   POCT GLUCOSE   Result Value Ref Range    POCT Glucose 97 74 - 99 mg/dL   POCT glucose   Result Value Ref Range    POCT Glucose 97 74 - 99 mg/dL   POCT GLUCOSE   Result Value Ref Range    POCT Glucose 78 74 - 99 mg/dL   POCT GLUCOSE   Result Value Ref Range    POCT Glucose 93 74 - 99 mg/dL   POCT GLUCOSE   Result Value Ref Range    POCT Glucose 111 (H) 74 - 99 mg/dL   Magnesium   Result Value Ref Range    Magnesium 2.10 1.60 - 3.10 mg/dL   CBC   Result Value Ref Range    WBC 5.2 4.4 - 11.3 x10*3/uL    nRBC 0.0 0.0 - 0.0 /100 WBCs    RBC 3.72 (L) 4.00 - 5.20 x10*6/uL    Hemoglobin 11.9 (L) 12.0 - 16.0 g/dL    Hematocrit 36.8 36.0 - 46.0 %    MCV 99 80 - 100 fL    MCH 32.0 26.0 - 34.0 pg    MCHC 32.3 32.0 - 36.0  g/dL    RDW 12.8 11.5 - 14.5 %    Platelets 180 150 - 450 x10*3/uL   Basic metabolic panel   Result Value Ref Range    Glucose 93 65 - 99 mg/dL    Sodium 142 133 - 145 mmol/L    Potassium 3.9 3.4 - 5.1 mmol/L    Chloride 108 (H) 97 - 107 mmol/L    Bicarbonate 23 (L) 24 - 31 mmol/L    Urea Nitrogen 18 8 - 25 mg/dL    Creatinine 0.90 0.40 - 1.60 mg/dL    eGFR 74 >60 mL/min/1.73m*2    Calcium 8.6 8.5 - 10.4 mg/dL    Anion Gap 11 <=19 mmol/L   POCT GLUCOSE   Result Value Ref Range    POCT Glucose 84 74 - 99 mg/dL     CT angio head and neck w and wo IV contrast  Result Date: 5/6/2024  No flow-limiting stenosis or occlusion is identified within the head or neck with findings as above. MRI would be more sensitive and specific if recent ischemia were clinically suspected.   Please refer to separately dictated noncontrast CT of the head performed same day for respective intracranial findings.   I personally reviewed the images/study and I agree with the findings as stated by Dr. Parks.        ECG 12 lead  Result Date: 5/6/2024  Sinus rhythm with Premature ventricular complexes or Fusion complexes Cannot rule out Anterior infarct , age undetermined ST & T wave abnormality, consider lateral ischemia Abnormal ECG artifact When compared with ECG of 06-MAY-2024 09:29, (unconfirmed) Previous ECG has undetermined rhythm, needs review Confirmed by Fernie Flores ([id number]) on 5/6/2024 5:08:59 PM     CT brain attack head wo IV contrast  Result Date: 5/6/2024  No acute intracranial hemorrhage, mass effect, or CT apparent acute infarct. Stable remote large right MCA distribution infarct.   Johnny Haro discussed the significance and urgency of this critical finding by telephone with  RAFFAELE LOPEZ on 5/6/2024 at 10:07 am.  (**-RCF-**) Findings:  See findings.          Assessment/Plan   Stroke-like sympt's of exact etiology unknown as per MRI brain which came back negative for acute events.  In ? If this was ictal instead.  To cont.  Post TNK tx protocol and potentially aspirin if not changes on follow up CT brain.  On further discussion w/ pat, she felted similar sympt's from a previous Sz event in the past.  For this reason, may benefit from taking an slight higher dose at bedtime that she agree with.  Need B12 supplementation.         Recommendations/Plan:  Cont post-IV thrombolysis protocol (ICU monitoring, CT brain 24 hrs post TNK and no antiplatelets or anticoagulants in 24 hrs post TNK).  2.    If follow up CT brain is unremarkable, will be able to go out of ICU from Neuro standpoint.    3.    Telemetry and Echo (as per primary medical team).   4.    Control of vascular risk factors, statin and start aspirin 81mg PO qday 24 hrs post TNK and if follow up CT brain is unremarkable and if no medical contraindications as per primary medical team.    5.    Cont keppra, but increase to 1,000mg PO QAM and 1,250mg PO Qpm.    6.    Get PT/OT and follow ST rec's.     7.    DVT prophylaxis w/ SCD's only for 24hrs post TNK, then will be ok to use chemo prophylaxis if follow up CT brain is unremarkable..    8.    Follow to cont.          Mike Mckenna MD

## 2024-05-07 NOTE — PROGRESS NOTES
Occupational Therapy    Evaluation/Treatment    Patient Name: Lexy Dunaway  MRN: [id number]  : 1965  Today's Date: 24  Time Calculation  Start Time: 1315  Stop Time: 1338  Time Calculation (min): 23 min       Assessment:  OT Assessment: Referral received, chart reviewed, and evaluation complete.  Patient is indepndent and does not require any acute OT services.  Prognosis: Good  Barriers to Discharge: None  Evaluation/Treatment Tolerance: Patient tolerated treatment well  Medical Staff Made Aware: Yes  End of Session Communication: Bedside nurse  End of Session Patient Position: Bed, 2 rail up    Plan:  No Skilled OT: Independent with ADLs  OT Discharge Recommendations: No OT needed after discharge, No further acute OT  OT Recommended Transfer Status: Independent  OT - OK to Discharge: Yes       Subjective   Current Problem:  1. Cerebrovascular accident (CVA), unspecified mechanism (Multi)  Transthoracic Echo (TTE) Complete    Transthoracic Echo (TTE) Complete      2. Left-sided weakness          General:   OT Received On: 24  General  Reason for Referral: decreased functional status  Referred By: SAEID Arias  Past Medical History Relevant to Rehab: remote R MCA infarct, SZs  Family/Caregiver Present: No  Prior to Session Communication: Bedside nurse  Patient Position Received: Bed, 3 rail up  General Comment: 58 year old WF admitted with c/o left sided weakness and word finding difficulties    Precautions:  Hearing/Visual Limitations: wears glasses, hearing WFL    Vital Signs:  Heart Rate: 70  Resp: 20    Pain:  Pain Assessment  Pain Assessment: 0-10  Pain Score: 0 - No pain    Objective   Cognition:  Overall Cognitive Status: Within Functional Limits  Orientation Level: Oriented X4      Home Living:  Type of Home: House  Lives With: Siblings (lives with her sister)  Home Adaptive Equipment: None  Home Layout: One level  Home Access: Level entry  Bathroom Shower/Tub: Tub/shower  unit  Bathroom Toilet: Standard  Bathroom Equipment: Grab bars in shower    Prior Function:  Level of [place]: Independent with ADLs and functional transfers, Independent with homemaking with ambulation  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)  Hand Dominance: Right    IADL History:  Homemaking Responsibilities: Yes    ADL:  Eating Assistance: Independent  Grooming Assistance: Independent  Bathing Assistance: Independent  UE Dressing Assistance: Independent  LE Dressing Assistance: Independent  Toileting Assistance with Device: Independent  Functional Assistance: Independent    Activities of Daily Living: LE Dressing  LE Dressing: Yes  Sock Level of Assistance: Independent  LE Dressing Where Assessed: Edge of bed    Toileting  Toileting Level of Assistance: Independent  Where Assessed: Toilet    Activity Tolerance:  Endurance: Endurance does not limit participation in activity       Bed Mobility/Transfers: Bed Mobility  Bed Mobility: Yes  Bed Mobility 1  Bed Mobility 1: Supine to sitting  Level of Assistance 1: Independent  Bed Mobility 2  Bed Mobility  2: Sitting to supine  Level of Assistance 2: Independent    Transfers  Transfer: Yes  Transfer 1  Transfer From 1: Bed to  Transfer to 1: Stand  Technique 1: Sit to stand  Transfer Level of Assistance 1: Independent  Transfers 2  Transfer From 2: Stand to  Transfer to 2: Toilet  Technique 2: Stand to sit  Transfer Level of Assistance 2: Independent  Transfers 3  Transfer From 3: Stand to  Transfer to 3: Bed  Technique 3: Stand to sit  Transfer Level of Assistance 3: Independent         Functional Mobility:  Functional Mobility  Functional Mobility Performed: Yes  Functional Mobility 1  Surface 1: Level tile  Device 1: No device  Assistance 1: Independent  Comments 1: Performed functional mobility a household and community distance independent  Sitting Balance:  Static Sitting Balance  Static Sitting-Balance Support: Feet supported  Static  Sitting-Level of Assistance: Independent  Standing Balance:  Static Standing Balance  Static Standing-Balance Support: No upper extremity supported  Static Standing-Level of Assistance: Independent    Sensation:  Light Touch: No apparent deficits  Strength:  Strength Comments: BuE 3+/5     Coordination:  Movements are Fluid and Coordinated: Yes   Hand Function:  Hand Function  Gross Grasp: Functional  Coordination: Functional    Outcome Measures: [facility] Daily Activity  Putting on and taking off regular lower body clothing: None  Bathing (including washing, rinsing, drying): None  Putting on and taking off regular upper body clothing: None  Toileting, which includes using toilet, bedpan or urinal: None  Taking care of personal grooming such as brushing teeth: None  Eating Meals: None  Daily Activity - Total Score: 24

## 2024-05-07 NOTE — PROGRESS NOTES
Physical Therapy    Physical Therapy Evaluation & Treatment    Patient Name: Lexy Dunaway  MRN: [id number]  Today's Date: 5/7/2024   Time Calculation  Start Time: 1137  Stop Time: 1157  Time Calculation (min): 20 min    Assessment/Plan   PT Assessment  Evaluation/Treatment Tolerance: Patient tolerated treatment well  Medical Staff Made Aware: Yes  Strengths: Ability to acquire knowledge, Premorbid level of function  End of Session Communication: Bedside nurse  Assessment Comment: Pt demonstrates functional mobility at baseline level; no acute skilled PT needs indicated at this time; will d/c from PT.  End of Session Patient Position: Up in chair, Alarm off, not on at start of session   IP OR SWING BED PT PLAN  Inpatient or Swing Bed: Inpatient  PT Plan  PT Plan: Skilled PT  PT Eval Only Reason: At baseline function  PT Frequency: PT eval only  PT Discharge Recommendations: No further acute PT  PT Recommended Transfer Status: Independent  PT - OK to Discharge: Yes    Subjective     General Visit Information:  General  Reason for Referral: impaired functional mobility (Pt is a 58 year-old F admitted from home with c/o L-sided weakness and word-finding difficulties; received TNK in ED; repeated 24 hr CT negative for hemorrhage.)  Referred By: ASPEN Arias-CNP  Past Medical History Relevant to Rehab: remote R MCA infarct, SZs  Family/Caregiver Present: No  Prior to Session Communication: Bedside nurse  Patient Position Received: Up in chair, Alarm off, not on at start of session  Preferred Learning Style: verbal  General Comment: Pt cleared for therapy via RN, received in sitting, NAD, agreeable to participate in therapy. (+) telemetry  Home Living:  Home Living  Type of Home: House  Lives With: Siblings (sister (home during day))  Home Adaptive Equipment: None  Home Layout: One level  Home Access: Level entry  Bathroom Shower/Tub: Tub/shower unit  Bathroom Toilet: Standard  Bathroom Equipment: Grab bars in  shower  Prior Level of Function:  Prior Function Per Pt/Caregiver Report  Level of [employer]: Independent with ADLs and functional transfers, Independent with homemaking with ambulation  Receives Help From: Family  ADL Assistance: Independent  Homemaking Assistance: Independent  Ambulatory Assistance: Independent  Vocational: Full time employment (airplane parts)  Hand Dominance: Right  Precautions:  Precautions  Hearing/Visual Limitations: glasses; hearing WFL  Medical Precautions: No known precautions/limitation  Vital Signs:  Vital Signs  Heart Rate: 53  SpO2: 100 %  BP: 113/83  MAP (mmHg): 91    Objective   Pain:  Pain Assessment  Pain Assessment: 0-10  Pain Score: 0 - No pain  Cognition:  Cognition  Overall Cognitive Status: Within Functional Limits  Orientation Level: Oriented X4    General Assessments:  General Observation  General Observation: pleasant/cooperative throughout     Activity Tolerance  Endurance: Endurance does not limit participation in activity    Sensation  Light Touch: No apparent deficits  Sensation Comment: pt denies paresthesias    Strength  Strength Comments: BLE > 4+/5  Strength  Strength Comments: BLE > 4+/5    Coordination  Movements are Fluid and Coordinated: Yes  Heel to Shin: Intact    Postural Control  Postural Control: Within Functional Limits    Static Sitting Balance  Static Sitting-Balance Support: Feet supported, Bilateral upper extremity supported  Static Sitting-Level of Assistance: Independent    Static Standing Balance  Static Standing-Balance Support: No upper extremity supported  Static Standing-Level of Assistance: Independent  Functional Assessments:     Bed Mobility  Bed Mobility: No    Transfers  Transfer: Yes  Transfer 1  Transfer From 1: Sit to, Stand to  Transfer to 1: Sit, Stand  Technique 1: Sit to stand, Stand to sit  Transfer Level of Assistance 1: Independent    Ambulation/Gait Training  Ambulation/Gait Training Performed: Yes  Ambulation/Gait Training  1  Surface 1: Level tile  Device 1: No device  Assistance 1: Independent  Quality of Gait 1:  (normal otis, reciprocating pattern, steady gait)  Comments/Distance (ft) 1: 50' x 2    Stairs  Stairs: No     Extremity/Trunk Assessments:  RLE   RLE : Within Functional Limits  LLE   LLE : Within Functional Limits  Treatments:  Ambulation/Gait Training  Ambulation/Gait Training Performed: Yes  Ambulation/Gait Training 1  Surface 1: Level tile  Device 1: No device  Assistance 1: Independent  Quality of Gait 1:  (normal otis, reciprocating pattern, steady gait)  Comments/Distance (ft) 1: 50' x 2  Outcome Measures:  [facility] Basic Mobility  Turning from your back to your side while in a flat bed without using bedrails: None  Moving from lying on your back to sitting on the side of a flat bed without using bedrails: None  Moving to and from bed to chair (including a wheelchair): None  Standing up from a chair using your arms (e.g. wheelchair or bedside chair): None  To walk in hospital room: None  Climbing 3-5 steps with railing: None  Basic Mobility - Total Score: 24    Encounter Problems       Encounter Problems (Active)       Pain - Adult             Encounter Problems (Resolved)       Mobility       STG - Patient will ambulate 50' with independence. (Met)       Start:  05/07/24    Expected End:  05/28/24    Resolved:  05/07/24                Education Documentation  Mobility Training, taught by Bouchra Chandler PT at 5/7/2024 12:40 PM.  Learner: Patient  Readiness: Eager  Method: Explanation, Demonstration  Response: Demonstrated Understanding    Education Comments  No comments found.

## 2024-05-07 NOTE — PROGRESS NOTES
05/07/24 1309   [facility] Disability Status   Are you deaf or do you have serious difficulty hearing? N   Are you blind or do you have serious difficulty seeing, even when wearing glasses? N   Because of a physical, mental, or emotional condition, do you have serious difficulty concentrating, remembering, or making decisions? (5 years old or older) N   Do you have serious difficulty walking or climbing stairs? N   Do you have serious difficulty dressing or bathing? N   Because of a physical, mental, or emotional condition, do you have serious difficulty doing errands alone such as visiting the doctor? N

## 2024-05-07 NOTE — PROGRESS NOTES
Lexy Dunaway is a 58 y.o. female on day 1 of admission presenting with Cerebrovascular accident (CVA), unspecified mechanism (Multi).      Subjective   Denies HA, new vision/speech changes        Objective     Last Recorded Vitals  BP 98/83   Pulse 70   Temp 36.7 °C (98.1 °F) (Temporal)   Resp 20   Wt 94.6 kg (208 lb 8.9 oz)   SpO2 94%   Intake/Output last 3 Shifts:    Intake/Output Summary (Last 24 hours) at 5/7/2024 1437  Last data filed at 5/7/2024 0700  Gross per 24 hour   Intake 240 ml   Output 1700 ml   Net -1460 ml       Admission Weight  Weight: 96.7 kg (213 lb 3 oz) (05/06/24 0929)    Daily Weight  05/07/24 : 94.6 kg (208 lb 8.9 oz)    Image Results  CT head wo IV contrast  Narrative: Interpreted By:  Casey Olson and Sheng Max   STUDY:  CT HEAD WO IV CONTRAST;  5/7/2024 10:16 am      INDICATION:  Signs/Symptoms:stroke-like sympt's; f/u post TNK tx.      COMPARISON:  MRI dated 05/06/2024      ACCESSION NUMBER(S):  [id number]      ORDERING CLINICIAN:  ANANDA DARLING      TECHNIQUE:  Axial CT images of the head were obtained without intravenous  contrast administration.      FINDINGS:  There is again evidence of an area of encephalomalacia within the  right middle cerebral artery territory involving the right temporal  lobe and right parietal lobe as well as the right insula. There is  compensatory dilatation of the adjacent right lateral ventricle.      No hyperdense acute intracranial hemorrhage is noted.      The ventricular system is similar in size and configuration when  compared with the prior MRI dated 05/06/2024 without evidence of  hydrocephalus.      There is minimal bowing of the septum pellucidum to the right of  midline presumably related to the asymmetric brain parenchymal volume  loss within the right cerebral hemisphere when compared with the left.      There is a small air/fluid level noted within the right sphenoid  sinus. The remaining paranasal sinuses are clear. The  middle ear  cavities and mastoid air cells are clear. There is suspected cerumen  within the right external auditory canal.      Impression: There is again evidence of an area of encephalomalacia within the  right middle cerebral artery territory involving the right temporal  lobe and right parietal lobe as well as the right insula. There is  compensatory dilatation of the adjacent right lateral ventricle.      No hyperdense acute intracranial hemorrhage is noted.      The ventricular system is similar in size and configuration when  compared with the prior MRI dated 05/06/2024 without evidence of  hydrocephalus.      I personally reviewed the images/study and I agree with the findings  as stated by Dr. Brock Pearson. This study was interpreted at [place], [place].      MACRO:  None.      Signed by: Casey Olson 5/7/2024 11:02 AM  Dictation workstation:   [id number]  MR brain wo IV contrast  Narrative: Interpreted By:  Lyle Lenz,   STUDY:  MR BRAIN WO IV CONTRAST;  5/6/2024 6:48 pm      INDICATION:  Signs/Symptoms:stroke-like sympts eval s/p TNK.      COMPARISON:  12/27/2023 CT head without contrast, 05/06/2024 CT head without  contrast      ACCESSION NUMBER(S):  [id number]      ORDERING CLINICIAN:  Mike Lin L,      TECHNIQUE:  Multiplanar, multi-sequence images of the brain were obtained without  contrast.      FINDINGS:  Diffusion weighted images show no evidence of acute ischemic infarct.      There is a large area of cystic encephalomalacia involving the right  temporal lobe and posterior parietal lobe. This is on a background of  mild periventricular and subcortical hemispheric T2/FLAIR white  matter hyperintensities are most compatible with chronic small vessel  ischemic disease.      There is no acute intraparenchymal hemorrhage, mass, mass-effect, or  an extra-axial fluid collection.      The ventricular size and cerebral volume are  age-concordant. There is  ex vacuo dilatation of the right lateral ventricle trigone and  temporal horn.      Normal morphology of midline structures. The craniovertebral junction  is normal.      The orbits and globes are unremarkable.      The paranasal sinuses show no air-fluid levels or hemorrhage.      The mastoid air cells are clear.      Impression: No acute infarct, acute hemorrhage, or intracranial mass effect.      Chronic infarct throughout the right temporal lobe and posterior  parietal lobe.      MACRO:  None.      Signed by: Lyle Lenz 5/7/2024 3:08 AM  Dictation workstation:   [id number]      Physical Exam  Constitutional:       General: She is not in acute distress.     Appearance: She is not ill-appearing or toxic-appearing.   HENT:      Head: Normocephalic.      Mouth/Throat:      Mouth: Mucous membranes are moist.   Eyes:      Extraocular Movements: Extraocular movements intact.      Conjunctiva/sclera: Conjunctivae normal.      Pupils: Pupils are equal, round, and reactive to light.   Cardiovascular:      Rate and Rhythm: Normal rate and regular rhythm.      Pulses: Normal pulses.      Heart sounds: Normal heart sounds, S1 normal and S2 normal.   Pulmonary:      Effort: Pulmonary effort is normal. No tachypnea, accessory muscle usage or respiratory distress.      Breath sounds: Normal breath sounds. No wheezing, rhonchi or rales.   Abdominal:      General: Abdomen is flat. Bowel sounds are normal.      Palpations: Abdomen is soft.      Tenderness: There is no abdominal tenderness.   Musculoskeletal:      Right lower leg: No edema.      Left lower leg: No edema.   Skin:     General: Skin is warm and dry.      Capillary Refill: Capillary refill takes less than 2 seconds.   Neurological:      General: No focal deficit present.      Mental Status: She is alert and oriented to person, place, and time. Mental status is at baseline.     Relevant Results  CT head wo IV contrast    Result Date:  5/7/2024  Interpreted By:  Casey Olson and Sheng Max STUDY: CT HEAD WO IV CONTRAST;  5/7/2024 10:16 am   INDICATION: Signs/Symptoms:stroke-like sympt's; f/u post TNK tx.   COMPARISON: MRI dated 05/06/2024   ACCESSION NUMBER(S): [id number]   ORDERING CLINICIAN: ANANDA DARLING   TECHNIQUE: Axial CT images of the head were obtained without intravenous contrast administration.   FINDINGS: There is again evidence of an area of encephalomalacia within the right middle cerebral artery territory involving the right temporal lobe and right parietal lobe as well as the right insula. There is compensatory dilatation of the adjacent right lateral ventricle.   No hyperdense acute intracranial hemorrhage is noted.   The ventricular system is similar in size and configuration when compared with the prior MRI dated 05/06/2024 without evidence of hydrocephalus.   There is minimal bowing of the septum pellucidum to the right of midline presumably related to the asymmetric brain parenchymal volume loss within the right cerebral hemisphere when compared with the left.   There is a small air/fluid level noted within the right sphenoid sinus. The remaining paranasal sinuses are clear. The middle ear cavities and mastoid air cells are clear. There is suspected cerumen within the right external auditory canal.       There is again evidence of an area of encephalomalacia within the right middle cerebral artery territory involving the right temporal lobe and right parietal lobe as well as the right insula. There is compensatory dilatation of the adjacent right lateral ventricle.   No hyperdense acute intracranial hemorrhage is noted.   The ventricular system is similar in size and configuration when compared with the prior MRI dated 05/06/2024 without evidence of hydrocephalus.   I personally reviewed the images/study and I agree with the findings as stated by Dr. Brock Pearson. This study was interpreted at [facility]  [place], [place].   MACRO: None.   Signed by: Casey Olson 5/7/2024 11:02 AM Dictation workstation:   [id number]    MR brain wo IV contrast    Result Date: 5/7/2024  Interpreted By:  Lyle Lenz, STUDY: MR BRAIN WO IV CONTRAST;  5/6/2024 6:48 pm   INDICATION: Signs/Symptoms:stroke-like sympts eval s/p TNK.   COMPARISON: 12/27/2023 CT head without contrast, 05/06/2024 CT head without contrast   ACCESSION NUMBER(S): [id number]   ORDERING CLINICIAN: Mike Lin L   TECHNIQUE: Multiplanar, multi-sequence images of the brain were obtained without contrast.   FINDINGS: Diffusion weighted images show no evidence of acute ischemic infarct.   There is a large area of cystic encephalomalacia involving the right temporal lobe and posterior parietal lobe. This is on a background of mild periventricular and subcortical hemispheric T2/FLAIR white matter hyperintensities are most compatible with chronic small vessel ischemic disease.   There is no acute intraparenchymal hemorrhage, mass, mass-effect, or an extra-axial fluid collection.   The ventricular size and cerebral volume are age-concordant. There is ex vacuo dilatation of the right lateral ventricle trigone and temporal horn.   Normal morphology of midline structures. The craniovertebral junction is normal.   The orbits and globes are unremarkable.   The paranasal sinuses show no air-fluid levels or hemorrhage.   The mastoid air cells are clear.       No acute infarct, acute hemorrhage, or intracranial mass effect.   Chronic infarct throughout the right temporal lobe and posterior parietal lobe.   MACRO: None.   Signed by: Lyle Lenz 5/7/2024 3:08 AM Dictation workstation:   [id number]    CT angio head and neck w and wo IV contrast    Result Date: 5/6/2024  Interpreted By:  Cortez Elizabeth,  and Charly Chávez STUDY: CT ANGIO HEAD AND NECK W AND WO IV CONTRAST   INDICATION: Signs/Symptoms:stroke-like sympts eval s/p TNK    COMPARISON: Noncontrast CT of the head performed same day at 0925 hours.   ACCESSION NUMBER(S): [id number]   ORDERING CLINICIAN: ANANDA DARLING   TECHNIQUE: Axial CT images of the head and neck were acquired after administration of 75 mL Omnipaque 350 intravenous contrast. Coronal and sagittal maximum intensity projections as well as curved multiplanar reformatted images and 3-D reconstructions were created on an independent workstation and provided for review.   FINDINGS: Please refer to separately dictated noncontrast CT of the head performed same day for respective intracranial findings. There is no interval change.   CTA head:   The intracranial internal carotid arteries are patent bilaterally. The anterior and middle cerebral arteries are patent bilaterally. The intracranial vertebral arteries, vertebrobasilar junction, basilar artery and posterior cerebral arteries are patent. Hypoplastic P1 posterior cerebral arteries with prominent posterior communicating artery supply the posterior cerebral arteries bilaterally, anatomic variant. No aneurysm or vascular malformation is identified.   CTA neck:   The left common carotid artery shares a common origin with the brachiocephalic artery, a normal variant. The visualized aortic arch and origin of the great vessels are patent. Approximately 50% stenosis of the distal right common carotid artery and less than 50% stenosis of the distal left common carotid artery due to atherosclerotic calcification. The common carotid arteries are otherwise patent. Eccentric calcifications noted at the carotid bifurcations with less than 50% stenosis on the right greater than left side by NASCET criteria. The cervical internal carotid arteries are patent bilaterally. No evidence of hemodynamically significant stenosis as measured per NASCET criteria.   The vertebral arteries originate from the subclavian arteries bilaterally and are patent throughout their course.   The  visualized lungs are clear. Degenerative changes of the visualized spine are most pronounced at C4-5 where there is moderate facet arthropathy.       No flow-limiting stenosis or occlusion is identified within the head or neck with findings as above. MRI would be more sensitive and specific if recent ischemia were clinically suspected.   Please refer to separately dictated noncontrast CT of the head performed same day for respective intracranial findings.   I personally reviewed the images/study and I agree with the findings as stated by Dr. Parks.   MACRO: None   Signed by: Cortez Elizabeth 5/6/2024 5:41 PM Dictation workstation:   [id number]    ECG 12 lead    Result Date: 5/6/2024  Sinus rhythm with Premature ventricular complexes or Fusion complexes Cannot rule out Anterior infarct , age undetermined ST & T wave abnormality, consider lateral ischemia Abnormal ECG artifact When compared with ECG of 06-MAY-2024 09:29, (unconfirmed) Previous ECG has undetermined rhythm, needs review Confirmed by Fernie Flores ([id number]) on 5/6/2024 5:08:59 PM    CT brain attack head wo IV contrast    Result Date: 5/6/2024  Interpreted By:  Johnny Haro, STUDY: CT BRAIN ATTACK HEAD WO IV CONTRAST;  5/6/2024 9:25 am   INDICATION: Signs/Symptoms:Stroke Evaluation.   COMPARISON: CT brain 12/27/2023 and MRI brain 05/23/2023   ACCESSION NUMBER(S): [id number]   ORDERING CLINICIAN: RAFFAELE LOPEZ   TECHNIQUE: Noncontrast axial CT scan of head was performed.   FINDINGS: Parenchyma: There is no intracranial hemorrhage. The grey-white differentiation is intact. There is no mass effect or midline shift. Redemonstration encephalomalacia right frontotemporal lobe, from prior right MCA distribution infarct.   CSF Spaces: The ventricles, sulci and basal cisterns are within normal limits for age.   Extra-Axial Fluid: There is no extraaxial fluid collection.   Calvarium: The calvarium is unremarkable.   Paranasal sinuses: Visualized paranasal sinuses  are clear.   Mastoids: Clear.   Orbits: Normal.   Soft tissues: Unremarkable.       No acute intracranial hemorrhage, mass effect, or CT apparent acute infarct. Stable remote large right MCA distribution infarct.   Johnny Haro discussed the significance and urgency of this critical finding by telephone with  RAFFAELE LOPEZ on 5/6/2024 at 10:07 am.  (**-RCF-**) Findings:  See findings.         Signed by: Johnny Haro 5/6/2024 10:07 AM Dictation workstation:   [id number]       Assessment/Plan   Stroke-like symptoms, s/p TNK administration  Epilepsy     Plan:  Cont post-IV thrombolysis protocol   Continuous telemetry monitoring, TTE  start aspirin 81mg PO qday  Cont keppra, but increase to 1,000mg PO QAM and 1,250mg PO Qpm.    PT/OT and ST   DVT prophylaxis     Wes Fraga MD

## 2024-05-07 NOTE — NURSING NOTE
Assumed care of patient.   She is alert and awake.  She feels good.  Her NIH is 0.  She was assisted up to the bathroom to void.  NAD.  VSS.

## 2024-05-07 NOTE — PROGRESS NOTES
[facility] Critical Care Medicine       Date:  5/7/2024  Patient:  Lexy Dunaway  YOB: 1965  MRN:  [id number]   Admit Date:  5/6/2024      Chief Complaint   Patient presents with    Cerebrovascular Accident     History of Present Illness:  Lexy Dunaway is a 58 y.o. female with PMHx of remote R MCA infarct (no deficits), nicotine use disorder and seizures who presenting to ED yesterday with acute left sided weakness and difficulty with word finding.  Found to have initial NIH score of 10. Head CT negative for acute bleed so she was administered weight based TNK. Her symptoms normalized to an NIH of 0.  She was admitted to the ICU for post CVA thrombolytic monitoring and care.          She sees Dr. Davy Mckeon for Neurology, last seen in March, 2024.  No changes, continued AED keppra.  Last seizure was in 2017.        Interval ICU Events:    5/6: Admitted to ICU post TNK administration.     5/7: Post TNK CT brain negative for any hemorrhage. Started on ASA and statin. NIH 0 today. Ok for transfer to SDU.     Medical History:  Past Medical History:   Diagnosis Date    Seizures (Multi)      History reviewed. No pertinent surgical history.  Medications Prior to Admission   Medication Sig Dispense Refill Last Dose    levETIRAcetam (Keppra) 1,000 mg tablet Take 1 tablet (1,000 mg) by mouth 2 times a day.   5/6/2024 at am     Patient has no known allergies.  Social History     Tobacco Use    Smoking status: Every Day     Current packs/day: 1.00     Types: Cigarettes   Vaping Use    Vaping status: Every Day     No family history on file.    Hospital Medications:         Current Facility-Administered Medications:     [Held by provider] aspirin EC tablet 81 mg, 81 mg, oral, Daily, SAEID Arias    atorvastatin (Lipitor) tablet 80 mg, 80 mg, oral, Nightly, SAEID Arias, 80 mg at 05/06/24 2036    dextrose 50 % injection 12.5 g, 12.5 g, intravenous, q15 min PRN, Esa Vázquez  "APRN-CNP    dextrose 50 % injection 25 g, 25 g, intravenous, q15 min PRN, SAEID Arias    [Held by provider] enoxaparin (Lovenox) syringe 40 mg, 40 mg, subcutaneous, q24h, SAEID Arias    glucagon (Glucagen) injection 1 mg, 1 mg, intramuscular, q15 min PRN, SAEID Arias    glucagon (Glucagen) injection 1 mg, 1 mg, intramuscular, q15 min PRN, SAEID Arias    hydrALAZINE (Apresoline) injection 10 mg, 10 mg, intravenous, q20 min PRN **FOLLOWED BY** [START ON 5/8/2024] hydrALAZINE (Apresoline) tablet 25 mg, 25 mg, oral, q6h PRN, SAEID Arias    insulin regular (HumuLIN R,NovoLIN R) injection 0-5 Units, 0-5 Units, subcutaneous, TID with meals, SAEID Arias    labetaloL (Normodyne,Trandate) injection 10 mg, 10 mg, intravenous, q10 min PRN, SAEID Arias    levETIRAcetam (Keppra) tablet 1,000 mg, 1,000 mg, oral, BID, SAEID Arias, 1,000 mg at 05/06/24 2035    nicotine (Nicoderm CQ) 14 mg/24 hr patch 1 patch, 1 patch, transdermal, Daily, 1 patch at 05/06/24 1353 **FOLLOWED BY** [START ON 6/17/2024] nicotine (Nicoderm CQ) 7 mg/24 hr patch 1 patch, 1 patch, transdermal, Daily, SAEID Arias    oxygen (O2) therapy, , inhalation, Continuous PRN - O2/gases, SAEID Arias, 2 L/min at 05/06/24 2129    polyethylene glycol (Glycolax, Miralax) packet 17 g, 17 g, oral, Daily, SAEID Arias, 17 g at 05/06/24 2036    Review of Systems:  14 point review of systems was completed and negative except for those specially mention in my HPI    Physical Exam:    Heart Rate:  [53-94]   Temp:  [36.4 °C (97.5 °F)-36.8 °C (98.2 °F)]   Resp:  [15-28]   BP: ()/()   Height:  [157.5 cm (5' 2\")-157.5 cm (5' 2.01\")]   Weight:  [94.6 kg (208 lb 8.9 oz)-96.7 kg (213 lb 3 oz)]   SpO2:  [69 %-99 %]     Physical Exam  Vitals and nursing note reviewed.   Constitutional:       General: She is not in acute " distress.     Appearance: She is not ill-appearing or toxic-appearing.   HENT:      Head: Normocephalic.      Mouth/Throat:      Mouth: Mucous membranes are moist.   Eyes:      Extraocular Movements: Extraocular movements intact.      Conjunctiva/sclera: Conjunctivae normal.      Pupils: Pupils are equal, round, and reactive to light.   Cardiovascular:      Rate and Rhythm: Normal rate and regular rhythm.      Pulses: Normal pulses.      Heart sounds: Normal heart sounds, S1 normal and S2 normal.   Pulmonary:      Effort: Pulmonary effort is normal. No tachypnea, accessory muscle usage or respiratory distress.      Breath sounds: Normal breath sounds. No wheezing, rhonchi or rales.   Abdominal:      General: Abdomen is flat. Bowel sounds are normal.      Palpations: Abdomen is soft.      Tenderness: There is no abdominal tenderness.   Musculoskeletal:      Right lower leg: No edema.      Left lower leg: No edema.   Skin:     General: Skin is warm and dry.      Capillary Refill: Capillary refill takes less than 2 seconds.   Neurological:      General: No focal deficit present.      Mental Status: She is alert and oriented to person, place, and time. Mental status is at baseline.      GCS: GCS eye subscore is 4. GCS verbal subscore is 5. GCS motor subscore is 6.   Psychiatric:         Mood and Affect: Mood and affect normal.         Speech: Speech normal.         Behavior: Behavior normal. Behavior is cooperative.       NIH Stroke Scale: 0    Objective:    I have reviewed all medications, laboratory results, and imaging pertinent for today's encounter.       Intake/Output Summary (Last 24 hours) at 5/7/2024 0813  Last data filed at 5/7/2024 0700  Gross per 24 hour   Intake 1240 ml   Output 1700 ml   Net -460 ml     Daily Labs:  CBC:   Results from last 7 days   Lab Units 05/07/24  0311   WBC AUTO x10*3/uL 5.2   HEMOGLOBIN g/dL 11.9*   HEMATOCRIT % 36.8   MCV fL 99     BMP:    Results from last 7 days   Lab Units  05/07/24  0311   SODIUM mmol/L 142   POTASSIUM mmol/L 3.9   CHLORIDE mmol/L 108*   CO2 mmol/L 23*   BUN mg/dL 18   CREATININE mg/dL 0.90   CALCIUM mg/dL 8.6   GLUCOSE mg/dL 93   MAGNESIUM mg/dL 2.10     Assessment/Plan:    I am currently managing this critically ill patient for the following problems:    Neuro/Psych/Pain Ctrl/Sedation:  # Acute CVA  # Hx R MCA infarct  # Hx seizure disorder  -Initial NIH 11-->Now 0  -MRI 5/6: No acute infarct, acute hemorrhage, or intracranial mass effect.  Chronic infarct throughout the right temporal lobe and posterior  parietal lobe.  -CT brain 5/7: There is again evidence of an area of encephalomalacia within the  right middle cerebral artery territory involving the right temporal  lobe and right parietal lobe as well as the right insula. There is  compensatory dilatation of the adjacent right lateral ventricle.  No hyperdense acute intracranial hemorrhage is noted.  -S/P TNK 1000 yesterday  -Continue home Keppra dosing 1000 mg am/1250 mg pm  -PRN labetalol and hydralazine for SBP >180  -Pain Control: No acute pain issues, Tylenol PRN  -CAM Assessment every shift, Promote Sleep/wake hygiene    Respiratory/ENT:  #Nicotine use disorder - Vapes  -Currently stable on RA  -Extensively educated on the importance of smoking cessation  -Nicotine patch daily  -Maintain SPO2 > 92%  -Promote Pulm Hygiene Daily     Cardiovascular:  #Hx HLD  -Lipitor 80 mg daily  -Maintain MAP > 65   -Continuous Cardiac Monitoring     GI:  #No acute issues  Diet: Regular  Ulcer Prophylaxis: None  Bowel Prophylaxis: None    Renal/Volume Status (Intra & Extravascular):  #No acute Issues  -Maintain UOP 0.5-1.0 mL/kg/hr, notify provider if less than ideal  -Daily BMP, Mag & Phos, Replete electrolytes as indicated     Endocrine  #Hyperglycemia  -Hbg a1c 5.2 yesterday  -POCT Glucose Q 4, SSI # 1 with meals    Infectious Disease:  #No acute issues  -Monitor for SIRS    Heme/Onc:  -Monitor for s/s of anemia  -CBC  daily, Transfuse for Hgb < 7     OBGYN/MSK:  -Padded pressure points  -Skin Protocol per ICU     Ethics/Code Status:  FULL CODE    :  DVT Prophylaxis: Will resume prophylactic Lovenox post CT brain   GI Prophylaxis: None  Bowel Regimen: Daily Nelda lax  Diet: Regular  CVC: None  Chuckey: None  Johnson: None  Restraints: None  Dispo: Transfer to SDU today    I have reviewed all medications, laboratory results, and imaging pertinent for today's encounter.  Plan Discussed with Dr. Hanks, Critical Care Time: 35 minutes  [facility] [place] Waller CNP

## 2024-05-07 NOTE — PROGRESS NOTES
05/07/24 1246   Physical Activity   On average, how many days per week do you engage in moderate to strenuous exercise (like a brisk walk)? 4 days   On average, how many minutes do you engage in exercise at this level? 30 min   Financial Resource Strain   How hard is it for you to pay for the very basics like food, housing, medical care, and heating? Not very   Housing Stability   In the last 12 months, was there a time when you were not able to pay the mortgage or rent on time? N   In the last 12 months, how many places have you lived? 1   In the last 12 months, was there a time when you did not have a steady place to sleep or slept in a shelter (including now)? N   Transportation Needs   In the past 12 months, has lack of transportation kept you from medical appointments or from getting medications? no   In the past 12 months, has lack of transportation kept you from meetings, work, or from getting things needed for daily living? No   Food Insecurity   Within the past 12 months, you worried that your food would run out before you got the money to buy more. Never true   Within the past 12 months, the food you bought just didn't last and you didn't have money to get more. Never true   Stress   Do you feel stress - tense, restless, nervous, or anxious, or unable to sleep at night because your mind is troubled all the time - these days? Only a littl   Social Connections   In a typical week, how many times do you talk on the phone with family, friends, or neighbors? More than 3   How often do you get together with friends or relatives? Once   How often do you attend Gnosticism or Taoist services? Never   Do you belong to any clubs or organizations such as Gnosticism groups, unions, fraternal or athletic groups, or school groups? No   How often do you attend meetings of the clubs or organizations you belong to? Never   Are you , , , , never , or living with a partner?     Intimate Partner Violence   Within the last year, have you been afraid of your partner or ex-partner? No   Within the last year, have you been humiliated or emotionally abused in other ways by your partner or ex-partner? No   Within the last year, have you been kicked, hit, slapped, or otherwise physically hurt by your partner or ex-partner? No   Within the last year, have you been raped or forced to have any kind of sexual activity by your partner or ex-partner? No   Alcohol Use   Q1: How often do you have a drink containing alcohol? Never   Q2: How many drinks containing alcohol do you have on a typical day when you are drinking? None   Q3: How often do you have six or more drinks on one occasion? Never   Utilities   In the past 12 months has the electric, gas, oil, or water company threatened to shut off services in your home? No   Health Literacy   How often do you need to have someone help you when you read instructions, pamphlets, or other written material from your doctor or pharmacy? Never

## 2024-05-07 NOTE — NURSING NOTE
Pt continues to deny headache, dizziness and L sided weakness post TNK. Pt is sitting up in bed eating dinner, VS stable.

## 2024-05-08 ENCOUNTER — APPOINTMENT (OUTPATIENT)
Dept: CARDIOLOGY | Facility: HOSPITAL | Age: 59
End: 2024-05-08

## 2024-05-08 VITALS
HEART RATE: 68 BPM | BODY MASS INDEX: 38.54 KG/M2 | SYSTOLIC BLOOD PRESSURE: 120 MMHG | HEIGHT: 62 IN | TEMPERATURE: 98.1 F | OXYGEN SATURATION: 93 % | RESPIRATION RATE: 17 BRPM | WEIGHT: 209.44 LBS | DIASTOLIC BLOOD PRESSURE: 67 MMHG

## 2024-05-08 LAB
ANION GAP SERPL CALC-SCNC: 14 MMOL/L
BUN SERPL-MCNC: 18 MG/DL (ref 8–25)
CALCIUM SERPL-MCNC: 9.3 MG/DL (ref 8.5–10.4)
CHLORIDE SERPL-SCNC: 106 MMOL/L (ref 97–107)
CO2 SERPL-SCNC: 23 MMOL/L (ref 24–31)
CREAT SERPL-MCNC: 0.9 MG/DL (ref 0.4–1.6)
EGFRCR SERPLBLD CKD-EPI 2021: 74 ML/MIN/1.73M*2
ERYTHROCYTE [DISTWIDTH] IN BLOOD BY AUTOMATED COUNT: 12.4 % (ref 11.5–14.5)
GLUCOSE BLD MANUAL STRIP-MCNC: 104 MG/DL (ref 74–99)
GLUCOSE BLD MANUAL STRIP-MCNC: 89 MG/DL (ref 74–99)
GLUCOSE SERPL-MCNC: 102 MG/DL (ref 65–99)
HCT VFR BLD AUTO: 37.9 % (ref 36–46)
HGB BLD-MCNC: 12.5 G/DL (ref 12–16)
MAGNESIUM SERPL-MCNC: 1.9 MG/DL (ref 1.6–3.1)
MCH RBC QN AUTO: 31.6 PG (ref 26–34)
MCHC RBC AUTO-ENTMCNC: 33 G/DL (ref 32–36)
MCV RBC AUTO: 96 FL (ref 80–100)
NRBC BLD-RTO: 0 /100 WBCS (ref 0–0)
PLATELET # BLD AUTO: 181 X10*3/UL (ref 150–450)
POTASSIUM SERPL-SCNC: 3.9 MMOL/L (ref 3.4–5.1)
RBC # BLD AUTO: 3.96 X10*6/UL (ref 4–5.2)
SODIUM SERPL-SCNC: 143 MMOL/L (ref 133–145)
WBC # BLD AUTO: 5.5 X10*3/UL (ref 4.4–11.3)

## 2024-05-08 PROCEDURE — 82947 ASSAY GLUCOSE BLOOD QUANT: CPT

## 2024-05-08 PROCEDURE — 2500000001 HC RX 250 WO HCPCS SELF ADMINISTERED DRUGS (ALT 637 FOR MEDICARE OP): Performed by: INTERNAL MEDICINE

## 2024-05-08 PROCEDURE — 80048 BASIC METABOLIC PNL TOTAL CA: CPT | Performed by: INTERNAL MEDICINE

## 2024-05-08 PROCEDURE — 83735 ASSAY OF MAGNESIUM: CPT | Performed by: INTERNAL MEDICINE

## 2024-05-08 PROCEDURE — S4991 NICOTINE PATCH NONLEGEND: HCPCS | Performed by: INTERNAL MEDICINE

## 2024-05-08 PROCEDURE — 85027 COMPLETE CBC AUTOMATED: CPT | Performed by: INTERNAL MEDICINE

## 2024-05-08 PROCEDURE — 36415 COLL VENOUS BLD VENIPUNCTURE: CPT | Performed by: INTERNAL MEDICINE

## 2024-05-08 PROCEDURE — 2500000002 HC RX 250 W HCPCS SELF ADMINISTERED DRUGS (ALT 637 FOR MEDICARE OP, ALT 636 FOR OP/ED): Performed by: INTERNAL MEDICINE

## 2024-05-08 RX ORDER — ASPIRIN 81 MG/1
81 TABLET ORAL DAILY
Qty: 90 TABLET | Refills: 0 | Status: SHIPPED | OUTPATIENT
Start: 2024-05-09

## 2024-05-08 RX ORDER — LEVETIRACETAM 250 MG/1
1250 TABLET ORAL NIGHTLY
Qty: 150 TABLET | Refills: 0 | Status: SHIPPED | OUTPATIENT
Start: 2024-05-08 | End: 2024-06-07

## 2024-05-08 RX ORDER — ATORVASTATIN CALCIUM 80 MG/1
80 TABLET, FILM COATED ORAL NIGHTLY
Qty: 90 TABLET | Refills: 0 | Status: SHIPPED | OUTPATIENT
Start: 2024-05-08

## 2024-05-08 RX ORDER — LANOLIN ALCOHOL/MO/W.PET/CERES
1000 CREAM (GRAM) TOPICAL DAILY
Qty: 30 TABLET | Refills: 0 | Status: SHIPPED | OUTPATIENT
Start: 2024-05-09

## 2024-05-08 RX ORDER — LEVETIRACETAM 1000 MG/1
1000 TABLET ORAL DAILY
Qty: 30 TABLET | Refills: 0 | Status: SHIPPED | OUTPATIENT
Start: 2024-05-09

## 2024-05-08 RX ADMIN — Medication 1 PATCH: at 08:55

## 2024-05-08 RX ADMIN — Medication 1000 MCG: at 08:56

## 2024-05-08 RX ADMIN — ASPIRIN 81 MG: 81 TABLET, COATED ORAL at 08:56

## 2024-05-08 RX ADMIN — LEVETIRACETAM 1000 MG: 500 TABLET, FILM COATED ORAL at 08:56

## 2024-05-08 ASSESSMENT — COGNITIVE AND FUNCTIONAL STATUS - GENERAL
DAILY ACTIVITIY SCORE: 24
MOBILITY SCORE: 24

## 2024-05-08 ASSESSMENT — PAIN - FUNCTIONAL ASSESSMENT
PAIN_FUNCTIONAL_ASSESSMENT: FLACC (FACE, LEGS, ACTIVITY, CRY, CONSOLABILITY)
PAIN_FUNCTIONAL_ASSESSMENT: FLACC (FACE, LEGS, ACTIVITY, CRY, CONSOLABILITY)

## 2024-05-08 ASSESSMENT — PAIN SCALES - GENERAL
PAINLEVEL_OUTOF10: 0 - NO PAIN

## 2024-05-08 NOTE — DISCHARGE SUMMARY
Discharge Diagnosis  Cerebrovascular accident (CVA), unspecified mechanism (Multi)    Issues Requiring Follow-Up  Stroke-like symptoms, s/p TNK administration  Epilepsy    Discharge Meds     Your medication list        START taking these medications        Instructions Last Dose Given Next Dose Due   aspirin 81 mg EC tablet  Start taking on: May 9, 2024      Take 1 tablet (81 mg) by mouth once daily.       atorvastatin 80 mg tablet  Commonly known as: Lipitor      Take 1 tablet (80 mg) by mouth once daily at bedtime.       cyanocobalamin 1,000 mcg tablet  Commonly known as: Vitamin B-12  Start taking on: May 9, 2024      Take 1 tablet (1,000 mcg) by mouth once daily.              CHANGE how you take these medications        Instructions Last Dose Given Next Dose Due   levETIRAcetam 250 mg tablet  Commonly known as: Keppra  What changed: You were already taking a medication with the same name, and this prescription was added. Make sure you understand how and when to take each.      Take 5 tablets (1,250 mg) by mouth once daily at bedtime.       levETIRAcetam 1,000 mg tablet  Commonly known as: Keppra  Start taking on: May 9, 2024  What changed: when to take this      Take 1 tablet (1,000 mg) by mouth once daily.                 Where to Get Your Medications        These medications were sent to "[facility] [address] [facility] [id number] [place] [id number]      Phone: [phone]   aspirin 81 mg EC tablet  atorvastatin 80 mg tablet  cyanocobalamin 1,000 mcg tablet  levETIRAcetam 1,000 mg tablet  levETIRAcetam 250 mg tablet         Test Results Pending At Discharge  Pending Labs       No current pending labs.            Hospital Course   Lexy Dunaway is a 58 y.o. female with PMHx of remote R MCA infarct (no deficits), nicotine use disorder and seizures who presenting to ED yesterday with acute left sided weakness and difficulty with word finding.  Found to have initial NIH score  of 10. Head CT negative for acute bleed so she was administered weight based TNK. Her symptoms normalized to an NIH of 0.  She was admitted to the ICU for post CVA thrombolytic monitoring and care.          She sees Dr. Davy Mckeon for Neurology, last seen in March, 2024.  No changes, continued AED keppra.  Last seizure was in 2017.       Neurologic symptoms resolved.  The patient was seen by neurology, MRI was negative, her seizure medications were adjusted, evening dose was increased.  The patient feels better, no further seizure or neurologic symptoms.  She is discharged home in a stable condition with close follow-up with her primary care physician    Pertinent Physical Exam At Time of Discharge  Physical Exam  Constitutional:       General: She is not in acute distress.     Appearance: She is not ill-appearing or toxic-appearing.   HENT:      Head: Normocephalic.      Mouth/Throat:      Mouth: Mucous membranes are moist.   Eyes:      Extraocular Movements: Extraocular movements intact.      Conjunctiva/sclera: Conjunctivae normal.      Pupils: Pupils are equal, round, and reactive to light.   Cardiovascular:      Rate and Rhythm: Normal rate and regular rhythm.      Pulses: Normal pulses.      Heart sounds: Normal heart sounds, S1 normal and S2 normal.   Pulmonary:      Effort: Pulmonary effort is normal. No tachypnea, accessory muscle usage or respiratory distress.      Breath sounds: Normal breath sounds. No wheezing, rhonchi or rales.   Abdominal:      General: Abdomen is flat. Bowel sounds are normal.      Palpations: Abdomen is soft.      Tenderness: There is no abdominal tenderness.   Musculoskeletal:      Right lower leg: No edema.      Left lower leg: No edema.   Skin:     General: Skin is warm and dry.      Capillary Refill: Capillary refill takes less than 2 seconds.   Neurological:      General: No focal deficit present.      Mental Status: She is alert and oriented to person, place, and time.  Mental status is at baseline.      Outpatient Follow-Up  Future Appointments   Date Time Provider Department Center   5/8/2024 10:45 AM FINN ECHO INPATIENT PORTABLE [facility]     Total time spent coordinating the patient's discharge is 32 minutes    Wes Fraag MD

## 2024-05-08 NOTE — NURSING NOTE
Took over care of pt at this time. Pt laying in bed, eyes closed, respirations even and unlabored. Pt awakes to my voice, alert, aox4. Pt denies pain and has no needs or complaints at this time. Neuro assessment unremarkable. Pt oriented to call bell and it and belongings are placed in reach. Telemetry leads connected and reading on monitor. Bed alarm refused. Continuing to monitor.

## 2024-05-08 NOTE — PROGRESS NOTES
Pt to dc home today.      05/08/24 8594   Discharge Planning   Patient expects to be discharged to: Home

## 2024-05-08 NOTE — NURSING NOTE
Rounded on pt. Pt laying in bed, eyes closed, respirations even and unlabored. Pt appears to be sleeping and in no apparent pain or distress. Neuro assessment remains unremarkable. Call bell and belongings are placed in reach. Telemetry leads connected and reading on monitor. Bed alarm refused. Continuing to monitor.

## 2024-05-08 NOTE — NURSING NOTE
Discharge completed. IV's and Tele removed. Stroke prevention education was provided again. PT stated she does not drive per Dr. Mckeon.     Pt was scheduled for TTE this afternoon. Pt stated she wants it done outpatient. Spoke with [facility]. Spoke with Dr. Fraga and he is discontinuing order for inpatient [facility]. And will send order for outpt.

## 2024-05-08 NOTE — NURSING NOTE
Assumed pt care. Neuro assessment remained unchanged overnight. Pt is up in bed and ordering breakfast.

## 2024-05-08 NOTE — CARE PLAN
Problem: General Stroke  Goal: Establish a mutual long term goal with patient by discharge  Outcome: Progressing  Goal: Demonstrate improvement in neurological exam throughout the shift  Outcome: Progressing  Goal: Maintain BP within ordered limits throughout shift  Outcome: Progressing  Goal: Participate in treatment (ie., meds, therapy) throughout shift  Outcome: Progressing  Goal: No symptoms of aspiration throughout shift  Outcome: Progressing  Goal: No symptoms of hemorrhage throughout shift  Outcome: Progressing     Problem: Pain - Adult  Goal: Verbalizes/displays adequate comfort level or baseline comfort level  Outcome: Progressing     Problem: Safety - Adult  Goal: Free from fall injury  Outcome: Progressing     Problem: Discharge Planning  Goal: Discharge to home or other facility with appropriate resources  Outcome: Progressing     Problem: Chronic Conditions and Co-morbidities  Goal: Patient's chronic conditions and co-morbidity symptoms are monitored and maintained or improved  Outcome: Progressing     Problem: Skin  Goal: Participates in plan/prevention/treatment measures  Outcome: Progressing  Goal: Prevent/manage excess moisture  Outcome: Progressing  Goal: Prevent/minimize sheer/friction injuries  Outcome: Progressing  Flowsheets (Taken 5/8/2024 0352)  Prevent/minimize sheer/friction injuries: Use pull sheet  Goal: Promote/optimize nutrition  Outcome: Progressing     Problem: Fall/Injury  Goal: Not fall by end of shift  Outcome: Progressing  Goal: Be free from injury by end of the shift  Outcome: Progressing  Goal: Verbalize understanding of personal risk factors for fall in the hospital  Outcome: Progressing  Goal: Verbalize understanding of risk factor reduction measures to prevent injury from fall in the home  Outcome: Progressing  Goal: Use assistive devices by end of the shift  Outcome: Progressing  Goal: Pace activities to prevent fatigue by end of the shift  Outcome: Progressing   The  patient's goals for the shift include able to eat.    The clinical goals for the shift include no neuro deficits, no bleeding    Over the shift, the patient did make progress toward the goals.

## 2024-05-08 NOTE — NURSING NOTE
Rounded on pt. Pt laying in bed, eyes closed, respirations even and unlabored. Pt appears to be sleeping and in no apparent pain or distress. Neuro assessment remains unremarkable. Uneventful night with no changes since prior assessment. Call bell and belongings are placed in reach. Telemetry leads connected and reading on monitor. Bed alarm refused. Continuing to monitor.

## 2024-08-16 ENCOUNTER — APPOINTMENT (OUTPATIENT)
Dept: RADIOLOGY | Facility: HOSPITAL | Age: 59
End: 2024-08-16

## 2024-08-16 ENCOUNTER — APPOINTMENT (OUTPATIENT)
Dept: CARDIOLOGY | Facility: HOSPITAL | Age: 59
End: 2024-08-16

## 2024-08-16 ENCOUNTER — HOSPITAL ENCOUNTER (INPATIENT)
Facility: HOSPITAL | Age: 59
LOS: 1 days | Discharge: HOME | End: 2024-08-17
Attending: STUDENT IN AN ORGANIZED HEALTH CARE EDUCATION/TRAINING PROGRAM | Admitting: FAMILY MEDICINE

## 2024-08-16 DIAGNOSIS — R29.898 LEFT ARM WEAKNESS: Primary | ICD-10-CM

## 2024-08-16 DIAGNOSIS — I63.9 CEREBROVASCULAR ACCIDENT (CVA), UNSPECIFIED MECHANISM (MULTI): ICD-10-CM

## 2024-08-16 DIAGNOSIS — R56.9 SEIZURE (MULTI): ICD-10-CM

## 2024-08-16 LAB
ALBUMIN SERPL-MCNC: 4.2 G/DL (ref 3.5–5)
ALP BLD-CCNC: 55 U/L (ref 35–125)
ALT SERPL-CCNC: 10 U/L (ref 5–40)
ANION GAP SERPL CALC-SCNC: 12 MMOL/L
APTT PPP: 27.3 SECONDS (ref 22–32.5)
AST SERPL-CCNC: 13 U/L (ref 5–40)
BASOPHILS # BLD AUTO: 0.05 X10*3/UL (ref 0–0.1)
BASOPHILS NFR BLD AUTO: 0.6 %
BILIRUB SERPL-MCNC: 0.2 MG/DL (ref 0.1–1.2)
BUN SERPL-MCNC: 10 MG/DL (ref 8–25)
CALCIUM SERPL-MCNC: 9.3 MG/DL (ref 8.5–10.4)
CHLORIDE SERPL-SCNC: 106 MMOL/L (ref 97–107)
CO2 SERPL-SCNC: 24 MMOL/L (ref 24–31)
CREAT SERPL-MCNC: 0.9 MG/DL (ref 0.4–1.6)
EGFRCR SERPLBLD CKD-EPI 2021: 74 ML/MIN/1.73M*2
EOSINOPHIL # BLD AUTO: 0.11 X10*3/UL (ref 0–0.7)
EOSINOPHIL NFR BLD AUTO: 1.4 %
ERYTHROCYTE [DISTWIDTH] IN BLOOD BY AUTOMATED COUNT: 11.7 % (ref 11.5–14.5)
GLUCOSE BLD MANUAL STRIP-MCNC: 121 MG/DL (ref 74–99)
GLUCOSE BLD MANUAL STRIP-MCNC: 129 MG/DL (ref 74–99)
GLUCOSE SERPL-MCNC: 122 MG/DL (ref 65–99)
HCT VFR BLD AUTO: 39.9 % (ref 36–46)
HGB BLD-MCNC: 13.3 G/DL (ref 12–16)
IMM GRANULOCYTES # BLD AUTO: 0.02 X10*3/UL (ref 0–0.7)
IMM GRANULOCYTES NFR BLD AUTO: 0.2 % (ref 0–0.9)
INR PPP: 1.1 (ref 0.9–1.2)
LYMPHOCYTES # BLD AUTO: 3.02 X10*3/UL (ref 1.2–4.8)
LYMPHOCYTES NFR BLD AUTO: 37.1 %
MCH RBC QN AUTO: 31 PG (ref 26–34)
MCHC RBC AUTO-ENTMCNC: 33.3 G/DL (ref 32–36)
MCV RBC AUTO: 93 FL (ref 80–100)
MONOCYTES # BLD AUTO: 0.41 X10*3/UL (ref 0.1–1)
MONOCYTES NFR BLD AUTO: 5 %
NEUTROPHILS # BLD AUTO: 4.53 X10*3/UL (ref 1.2–7.7)
NEUTROPHILS NFR BLD AUTO: 55.7 %
NRBC BLD-RTO: 0 /100 WBCS (ref 0–0)
PLATELET # BLD AUTO: 209 X10*3/UL (ref 150–450)
POCT GLUCOSE: 121 MG/DL (ref 74–99)
POTASSIUM SERPL-SCNC: 3.4 MMOL/L (ref 3.4–5.1)
PROT SERPL-MCNC: 7.5 G/DL (ref 5.9–7.9)
PROTHROMBIN TIME: 11.3 SECONDS (ref 9.3–12.7)
RBC # BLD AUTO: 4.29 X10*6/UL (ref 4–5.2)
SODIUM SERPL-SCNC: 142 MMOL/L (ref 133–145)
TROPONIN T SERPL-MCNC: 8 NG/L
WBC # BLD AUTO: 8.1 X10*3/UL (ref 4.4–11.3)

## 2024-08-16 PROCEDURE — 85730 THROMBOPLASTIN TIME PARTIAL: CPT | Performed by: STUDENT IN AN ORGANIZED HEALTH CARE EDUCATION/TRAINING PROGRAM

## 2024-08-16 PROCEDURE — 85025 COMPLETE CBC W/AUTO DIFF WBC: CPT | Performed by: STUDENT IN AN ORGANIZED HEALTH CARE EDUCATION/TRAINING PROGRAM

## 2024-08-16 PROCEDURE — 93005 ELECTROCARDIOGRAM TRACING: CPT

## 2024-08-16 PROCEDURE — 70450 CT HEAD/BRAIN W/O DYE: CPT | Performed by: RADIOLOGY

## 2024-08-16 PROCEDURE — 36415 COLL VENOUS BLD VENIPUNCTURE: CPT | Performed by: STUDENT IN AN ORGANIZED HEALTH CARE EDUCATION/TRAINING PROGRAM

## 2024-08-16 PROCEDURE — 85610 PROTHROMBIN TIME: CPT | Performed by: STUDENT IN AN ORGANIZED HEALTH CARE EDUCATION/TRAINING PROGRAM

## 2024-08-16 PROCEDURE — 70551 MRI BRAIN STEM W/O DYE: CPT

## 2024-08-16 PROCEDURE — 82947 ASSAY GLUCOSE BLOOD QUANT: CPT

## 2024-08-16 PROCEDURE — 2500000004 HC RX 250 GENERAL PHARMACY W/ HCPCS (ALT 636 FOR OP/ED): Performed by: FAMILY MEDICINE

## 2024-08-16 PROCEDURE — 2060000001 HC INTERMEDIATE ICU ROOM DAILY

## 2024-08-16 PROCEDURE — 80053 COMPREHEN METABOLIC PANEL: CPT | Performed by: STUDENT IN AN ORGANIZED HEALTH CARE EDUCATION/TRAINING PROGRAM

## 2024-08-16 PROCEDURE — 84484 ASSAY OF TROPONIN QUANT: CPT | Performed by: STUDENT IN AN ORGANIZED HEALTH CARE EDUCATION/TRAINING PROGRAM

## 2024-08-16 PROCEDURE — 99291 CRITICAL CARE FIRST HOUR: CPT

## 2024-08-16 PROCEDURE — 70551 MRI BRAIN STEM W/O DYE: CPT | Performed by: SURGERY

## 2024-08-16 PROCEDURE — 99223 1ST HOSP IP/OBS HIGH 75: CPT | Performed by: FAMILY MEDICINE

## 2024-08-16 PROCEDURE — 70450 CT HEAD/BRAIN W/O DYE: CPT

## 2024-08-16 RX ORDER — ENOXAPARIN SODIUM 100 MG/ML
40 INJECTION SUBCUTANEOUS DAILY
Status: DISCONTINUED | OUTPATIENT
Start: 2024-08-16 | End: 2024-08-17 | Stop reason: HOSPADM

## 2024-08-16 SDOH — ECONOMIC STABILITY: HOUSING INSECURITY: AT ANY TIME IN THE PAST 12 MONTHS, WERE YOU HOMELESS OR LIVING IN A SHELTER (INCLUDING NOW)?: PATIENT DECLINED

## 2024-08-16 SDOH — HEALTH STABILITY: MENTAL HEALTH
DO YOU FEEL STRESS - TENSE, RESTLESS, NERVOUS, OR ANXIOUS, OR UNABLE TO SLEEP AT NIGHT BECAUSE YOUR MIND IS TROUBLED ALL THE TIME - THESE DAYS?: NOT AT ALL

## 2024-08-16 SDOH — ECONOMIC STABILITY: FOOD INSECURITY: WITHIN THE PAST 12 MONTHS, THE FOOD YOU BOUGHT JUST DIDN'T LAST AND YOU DIDN'T HAVE MONEY TO GET MORE.: NEVER TRUE

## 2024-08-16 SDOH — ECONOMIC STABILITY: INCOME INSECURITY: HOW HARD IS IT FOR YOU TO PAY FOR THE VERY BASICS LIKE FOOD, HOUSING, MEDICAL CARE, AND HEATING?: NOT HARD AT ALL

## 2024-08-16 SDOH — ECONOMIC STABILITY: TRANSPORTATION INSECURITY
IN THE PAST 12 MONTHS, HAS THE LACK OF TRANSPORTATION KEPT YOU FROM MEDICAL APPOINTMENTS OR FROM GETTING MEDICATIONS?: PATIENT DECLINED

## 2024-08-16 SDOH — ECONOMIC STABILITY: INCOME INSECURITY: IN THE LAST 12 MONTHS, WAS THERE A TIME WHEN YOU WERE NOT ABLE TO PAY THE MORTGAGE OR RENT ON TIME?: NO

## 2024-08-16 SDOH — SOCIAL STABILITY: SOCIAL INSECURITY: DO YOU FEEL UNSAFE GOING BACK TO THE PLACE WHERE YOU ARE LIVING?: NO

## 2024-08-16 SDOH — HEALTH STABILITY: MENTAL HEALTH: HOW OFTEN DO YOU HAVE A DRINK CONTAINING ALCOHOL?: NEVER

## 2024-08-16 SDOH — ECONOMIC STABILITY: INCOME INSECURITY: IN THE PAST 12 MONTHS HAS THE ELECTRIC, GAS, OIL, OR WATER COMPANY THREATENED TO SHUT OFF SERVICES IN YOUR HOME?: NO

## 2024-08-16 SDOH — ECONOMIC STABILITY: FOOD INSECURITY: WITHIN THE PAST 12 MONTHS, YOU WORRIED THAT YOUR FOOD WOULD RUN OUT BEFORE YOU GOT THE MONEY TO BUY MORE.: NEVER TRUE

## 2024-08-16 SDOH — HEALTH STABILITY: MENTAL HEALTH: HOW OFTEN DO YOU HAVE 6 OR MORE DRINKS ON ONE OCCASION?: NEVER

## 2024-08-16 SDOH — SOCIAL STABILITY: SOCIAL INSECURITY: ARE THERE ANY APPARENT SIGNS OF INJURIES/BEHAVIORS THAT COULD BE RELATED TO ABUSE/NEGLECT?: NO

## 2024-08-16 SDOH — SOCIAL STABILITY: SOCIAL INSECURITY: WERE YOU ABLE TO COMPLETE ALL THE BEHAVIORAL HEALTH SCREENINGS?: YES

## 2024-08-16 SDOH — SOCIAL STABILITY: SOCIAL INSECURITY: WITHIN THE LAST YEAR, HAVE YOU BEEN HUMILIATED OR EMOTIONALLY ABUSED IN OTHER WAYS BY YOUR PARTNER OR EX-PARTNER?: NO

## 2024-08-16 SDOH — SOCIAL STABILITY: SOCIAL NETWORK: ARE YOU MARRIED, WIDOWED, DIVORCED, SEPARATED, NEVER MARRIED, OR LIVING WITH A PARTNER?: WIDOWED

## 2024-08-16 SDOH — HEALTH STABILITY: PHYSICAL HEALTH: ON AVERAGE, HOW MANY MINUTES DO YOU ENGAGE IN EXERCISE AT THIS LEVEL?: 30 MIN

## 2024-08-16 SDOH — HEALTH STABILITY: MENTAL HEALTH: HOW MANY DRINKS CONTAINING ALCOHOL DO YOU HAVE ON A TYPICAL DAY WHEN YOU ARE DRINKING?: PATIENT DOES NOT DRINK

## 2024-08-16 SDOH — SOCIAL STABILITY: SOCIAL NETWORK: HOW OFTEN DO YOU GET TOGETHER WITH FRIENDS OR RELATIVES?: NEVER

## 2024-08-16 SDOH — ECONOMIC STABILITY: HOUSING INSECURITY: IN THE PAST 12 MONTHS, HOW MANY TIMES HAVE YOU MOVED WHERE YOU WERE LIVING?: 0

## 2024-08-16 SDOH — SOCIAL STABILITY: SOCIAL INSECURITY: ABUSE: ADULT

## 2024-08-16 SDOH — ECONOMIC STABILITY: INCOME INSECURITY: IN THE PAST 12 MONTHS, HAS THE ELECTRIC, GAS, OIL, OR WATER COMPANY THREATENED TO SHUT OFF SERVICE IN YOUR HOME?: NO

## 2024-08-16 SDOH — SOCIAL STABILITY: SOCIAL NETWORK: IN A TYPICAL WEEK, HOW MANY TIMES DO YOU TALK ON THE PHONE WITH FAMILY, FRIENDS, OR NEIGHBORS?: ONCE A WEEK

## 2024-08-16 SDOH — ECONOMIC STABILITY: TRANSPORTATION INSECURITY
IN THE PAST 12 MONTHS, HAS LACK OF TRANSPORTATION KEPT YOU FROM MEDICAL APPOINTMENTS OR FROM GETTING MEDICATIONS?: PATIENT DECLINED

## 2024-08-16 SDOH — SOCIAL STABILITY: SOCIAL INSECURITY: WITHIN THE LAST YEAR, HAVE YOU BEEN AFRAID OF YOUR PARTNER OR EX-PARTNER?: NO

## 2024-08-16 SDOH — SOCIAL STABILITY: SOCIAL NETWORK: HOW OFTEN DO YOU ATTEND MEETINGS OF THE CLUBS OR ORGANIZATIONS YOU BELONG TO?: NEVER

## 2024-08-16 SDOH — ECONOMIC STABILITY: HOUSING INSECURITY: AT ANY TIME IN THE PAST 12 MONTHS, WERE YOU HOMELESS OR LIVING IN A SHELTER (INCLUDING NOW)?: NO

## 2024-08-16 SDOH — HEALTH STABILITY: MENTAL HEALTH: HOW OFTEN DO YOU HAVE SIX OR MORE DRINKS ON ONE OCCASION?: NEVER

## 2024-08-16 SDOH — HEALTH STABILITY: PHYSICAL HEALTH: ON AVERAGE, HOW MANY DAYS PER WEEK DO YOU ENGAGE IN MODERATE TO STRENUOUS EXERCISE (LIKE A BRISK WALK)?: 7 DAYS

## 2024-08-16 SDOH — ECONOMIC STABILITY: TRANSPORTATION INSECURITY: IN THE PAST 12 MONTHS, HAS LACK OF TRANSPORTATION KEPT YOU FROM MEDICAL APPOINTMENTS OR FROM GETTING MEDICATIONS?: NO

## 2024-08-16 SDOH — ECONOMIC STABILITY: INCOME INSECURITY: HOW HARD IS IT FOR YOU TO PAY FOR THE VERY BASICS LIKE FOOD, HOUSING, MEDICAL CARE, AND HEATING?: PATIENT DECLINED

## 2024-08-16 SDOH — SOCIAL STABILITY: SOCIAL NETWORK: HOW OFTEN DO YOU ATTEND CHURCH OR RELIGIOUS SERVICES?: NEVER

## 2024-08-16 SDOH — ECONOMIC STABILITY: FOOD INSECURITY: WITHIN THE PAST 12 MONTHS, YOU WORRIED THAT YOUR FOOD WOULD RUN OUT BEFORE YOU GOT MONEY TO BUY MORE.: NEVER TRUE

## 2024-08-16 SDOH — HEALTH STABILITY: MENTAL HEALTH: HOW MANY STANDARD DRINKS CONTAINING ALCOHOL DO YOU HAVE ON A TYPICAL DAY?: PATIENT DOES NOT DRINK

## 2024-08-16 SDOH — HEALTH STABILITY: MENTAL HEALTH
STRESS IS WHEN SOMEONE FEELS TENSE, NERVOUS, ANXIOUS, OR CAN'T SLEEP AT NIGHT BECAUSE THEIR MIND IS TROUBLED. HOW STRESSED ARE YOU?: NOT AT ALL

## 2024-08-16 SDOH — ECONOMIC STABILITY: FOOD INSECURITY: HOW HARD IS IT FOR YOU TO PAY FOR THE VERY BASICS LIKE FOOD, HOUSING, MEDICAL CARE, AND HEATING?: NOT HARD AT ALL

## 2024-08-16 SDOH — SOCIAL STABILITY: SOCIAL INSECURITY: DO YOU FEEL ANYONE HAS EXPLOITED OR TAKEN ADVANTAGE OF YOU FINANCIALLY OR OF YOUR PERSONAL PROPERTY?: NO

## 2024-08-16 SDOH — SOCIAL STABILITY: SOCIAL INSECURITY: HAS ANYONE EVER THREATENED TO HURT YOUR FAMILY OR YOUR PETS?: NO

## 2024-08-16 SDOH — ECONOMIC STABILITY: INCOME INSECURITY: IN THE LAST 12 MONTHS, WAS THERE A TIME WHEN YOU WERE NOT ABLE TO PAY THE MORTGAGE OR RENT ON TIME?: PATIENT DECLINED

## 2024-08-16 SDOH — ECONOMIC STABILITY: HOUSING INSECURITY: IN THE LAST 12 MONTHS, WAS THERE A TIME WHEN YOU WERE NOT ABLE TO PAY THE MORTGAGE OR RENT ON TIME?: NO

## 2024-08-16 SDOH — HEALTH STABILITY: PHYSICAL HEALTH: ON AVERAGE, HOW MANY MINUTES DO YOU ENGAGE IN EXERCISE AT THIS LEVEL?: 0 MIN

## 2024-08-16 SDOH — SOCIAL STABILITY: SOCIAL NETWORK: HOW OFTEN DO YOU ATTENT MEETINGS OF THE CLUB OR ORGANIZATION YOU BELONG TO?: NEVER

## 2024-08-16 SDOH — SOCIAL STABILITY: SOCIAL INSECURITY: ARE YOU OR HAVE YOU BEEN THREATENED OR ABUSED PHYSICALLY, EMOTIONALLY, OR SEXUALLY BY ANYONE?: NO

## 2024-08-16 SDOH — ECONOMIC STABILITY: FOOD INSECURITY: HOW HARD IS IT FOR YOU TO PAY FOR THE VERY BASICS LIKE FOOD, HOUSING, MEDICAL CARE, AND HEATING?: PATIENT DECLINED

## 2024-08-16 SDOH — SOCIAL STABILITY: SOCIAL INSECURITY: DOES ANYONE TRY TO KEEP YOU FROM HAVING/CONTACTING OTHER FRIENDS OR DOING THINGS OUTSIDE YOUR HOME?: NO

## 2024-08-16 SDOH — SOCIAL STABILITY: SOCIAL INSECURITY: HAVE YOU HAD THOUGHTS OF HARMING ANYONE ELSE?: NO

## 2024-08-16 SDOH — SOCIAL STABILITY: SOCIAL INSECURITY: HAVE YOU HAD ANY THOUGHTS OF HARMING ANYONE ELSE?: NO

## 2024-08-16 SDOH — HEALTH STABILITY: PHYSICAL HEALTH: ON AVERAGE, HOW MANY DAYS PER WEEK DO YOU ENGAGE IN MODERATE TO STRENUOUS EXERCISE (LIKE A BRISK WALK)?: 0 DAYS

## 2024-08-16 SDOH — SOCIAL STABILITY: SOCIAL INSECURITY: ARE YOU MARRIED, WIDOWED, DIVORCED, SEPARATED, NEVER MARRIED, OR LIVING WITH A PARTNER?: WIDOWED

## 2024-08-16 SDOH — ECONOMIC STABILITY: TRANSPORTATION INSECURITY
IN THE PAST 12 MONTHS, HAS LACK OF TRANSPORTATION KEPT YOU FROM MEETINGS, WORK, OR FROM GETTING THINGS NEEDED FOR DAILY LIVING?: PATIENT DECLINED

## 2024-08-16 SDOH — ECONOMIC STABILITY: HOUSING INSECURITY: IN THE LAST 12 MONTHS, WAS THERE A TIME WHEN YOU WERE NOT ABLE TO PAY THE MORTGAGE OR RENT ON TIME?: PATIENT DECLINED

## 2024-08-16 ASSESSMENT — PATIENT HEALTH QUESTIONNAIRE - PHQ9
1. LITTLE INTEREST OR PLEASURE IN DOING THINGS: NOT AT ALL
SUM OF ALL RESPONSES TO PHQ9 QUESTIONS 1 & 2: 0
2. FEELING DOWN, DEPRESSED OR HOPELESS: NOT AT ALL

## 2024-08-16 ASSESSMENT — LIFESTYLE VARIABLES
AUDIT-C TOTAL SCORE: 0
HAVE YOU EVER FELT YOU SHOULD CUT DOWN ON YOUR DRINKING: NO
AUDIT-C TOTAL SCORE: 0
EVER FELT BAD OR GUILTY ABOUT YOUR DRINKING: NO
PRESCIPTION_ABUSE_PAST_12_MONTHS: NO
EVER HAD A DRINK FIRST THING IN THE MORNING TO STEADY YOUR NERVES TO GET RID OF A HANGOVER: NO
HOW OFTEN DO YOU HAVE 6 OR MORE DRINKS ON ONE OCCASION: NEVER
AUDIT-C TOTAL SCORE: 0
TOTAL SCORE: 0
HOW OFTEN DO YOU HAVE A DRINK CONTAINING ALCOHOL: NEVER
HAVE PEOPLE ANNOYED YOU BY CRITICIZING YOUR DRINKING: NO
SKIP TO QUESTIONS 9-10: 1
SUBSTANCE_ABUSE_PAST_12_MONTHS: NO
HOW MANY STANDARD DRINKS CONTAINING ALCOHOL DO YOU HAVE ON A TYPICAL DAY: PATIENT DOES NOT DRINK
SKIP TO QUESTIONS 9-10: 1

## 2024-08-16 ASSESSMENT — COGNITIVE AND FUNCTIONAL STATUS - GENERAL
DAILY ACTIVITIY SCORE: 24
MOBILITY SCORE: 24
PATIENT BASELINE BEDBOUND: NO

## 2024-08-16 ASSESSMENT — ACTIVITIES OF DAILY LIVING (ADL)
LACK_OF_TRANSPORTATION: NO
HEARING - LEFT EAR: FUNCTIONAL
TOILETING: INDEPENDENT
DRESSING YOURSELF: INDEPENDENT
JUDGMENT_ADEQUATE_SAFELY_COMPLETE_DAILY_ACTIVITIES: YES
BATHING: INDEPENDENT
PATIENT'S MEMORY ADEQUATE TO SAFELY COMPLETE DAILY ACTIVITIES?: YES
ASSISTIVE_DEVICE: EYEGLASSES
HEARING - RIGHT EAR: FUNCTIONAL
FEEDING YOURSELF: INDEPENDENT
LACK_OF_TRANSPORTATION: NO
LACK_OF_TRANSPORTATION: PATIENT DECLINED
GROOMING: INDEPENDENT
WALKS IN HOME: INDEPENDENT
ADEQUATE_TO_COMPLETE_ADL: YES

## 2024-08-16 ASSESSMENT — PAIN - FUNCTIONAL ASSESSMENT
PAIN_FUNCTIONAL_ASSESSMENT: 0-10
PAIN_FUNCTIONAL_ASSESSMENT: 0-10

## 2024-08-16 ASSESSMENT — COLUMBIA-SUICIDE SEVERITY RATING SCALE - C-SSRS
2. HAVE YOU ACTUALLY HAD ANY THOUGHTS OF KILLING YOURSELF?: NO
1. IN THE PAST MONTH, HAVE YOU WISHED YOU WERE DEAD OR WISHED YOU COULD GO TO SLEEP AND NOT WAKE UP?: NO
6. HAVE YOU EVER DONE ANYTHING, STARTED TO DO ANYTHING, OR PREPARED TO DO ANYTHING TO END YOUR LIFE?: NO

## 2024-08-16 ASSESSMENT — PAIN SCALES - GENERAL
PAINLEVEL_OUTOF10: 0 - NO PAIN
PAINLEVEL_OUTOF10: 0 - NO PAIN

## 2024-08-16 NOTE — CARE PLAN
Pt does not have a POA or Living Will  ADOD: 3 days    Pt lives at home with her sister Cheryl in a 1 story home and 1 step to climb to enter the home  Pt works full time; she does not drive, her sister drives her to her appointments, ect.  She does not use any assistive device, no home 02/cpap/bipap/nebulizer  She is independent with ADL's. She wears glasses, no hearing aids  She does not have insurance, no PCP.  Pt was given the number for [employer] and the address and phone number for [facility]  Annie Villavicencio from patient financial was in the room speaking to the pt; forms filled out for financial assistance.  Pt is here for Left arm weakness; recent hx of MCA infarct without residual  No anticipated discharge needs at this time    DISCHARGE PLAN: HOME WITH SISTER

## 2024-08-16 NOTE — CARE PLAN
The patient's goals for the shift include  discharge    The clinical goals for the shift include neuro checks

## 2024-08-16 NOTE — PROGRESS NOTES
08/16/24 1303   [facility] Disability Status   Are you deaf or do you have serious difficulty hearing? N   Are you blind or do you have serious difficulty seeing, even when wearing glasses? N   Because of a physical, mental, or emotional condition, do you have serious difficulty concentrating, remembering, or making decisions? (5 years old or older) N   Do you have serious difficulty walking or climbing stairs? N   Do you have serious difficulty dressing or bathing? N   Because of a physical, mental, or emotional condition, do you have serious difficulty doing errands alone such as visiting the doctor? N

## 2024-08-16 NOTE — PROGRESS NOTES
08/16/24 1256   Discharge Planning   Living Arrangements Family members   Support Systems Family members   Type of Residence Private residence   Number of Stairs to Enter Residence 1   Number of Stairs Within Residence 0   Do you have animals or pets at home? No   Who is requesting discharge planning? Provider   Home or Post Acute Services None   Expected Discharge Disposition Home   Does the patient need discharge transport arranged? No   Financial Resource Strain   How hard is it for you to pay for the very basics like food, housing, medical care, and heating? Not hard   Housing Stability   In the last 12 months, was there a time when you were not able to pay the mortgage or rent on time? N   In the past 12 months, how many times have you moved where you were living? 0   At any time in the past 12 months, were you homeless or living in a shelter (including now)? N   Transportation Needs   In the past 12 months, has lack of transportation kept you from medical appointments or from getting medications? no   In the past 12 months, has lack of transportation kept you from meetings, work, or from getting things needed for daily living? No   Patient Choice   Patient / Family choosing to utilize agency / facility established prior to hospitalization No

## 2024-08-16 NOTE — PROGRESS NOTES
08/16/24 1253   Physical Activity   On average, how many days per week do you engage in moderate to strenuous exercise (like a brisk walk)? 0 days   On average, how many minutes do you engage in exercise at this level? 0 min   Financial Resource Strain   How hard is it for you to pay for the very basics like food, housing, medical care, and heating? Not hard   Housing Stability   In the last 12 months, was there a time when you were not able to pay the mortgage or rent on time? N   In the past 12 months, how many times have you moved where you were living? 0   At any time in the past 12 months, were you homeless or living in a shelter (including now)? N   Transportation Needs   In the past 12 months, has lack of transportation kept you from medical appointments or from getting medications? no   In the past 12 months, has lack of transportation kept you from meetings, work, or from getting things needed for daily living? No   Food Insecurity   Within the past 12 months, you worried that your food would run out before you got the money to buy more. Never true   Within the past 12 months, the food you bought just didn't last and you didn't have money to get more. Never true   Stress   Do you feel stress - tense, restless, nervous, or anxious, or unable to sleep at night because your mind is troubled all the time - these days? To some exte  (Pt does not have a PCP-no insurance-gave address and phone number for the [facility])   Social Connections   In a typical week, how many times do you talk on the phone with family, friends, or neighbors? Once a week   How often do you get together with friends or relatives? Never  (pt said that she visits with family/friends 2 x/month)   How often do you attend Christian or Voodoo services? Never   Do you belong to any clubs or organizations such as Christian groups, unions, fraternal or athletic groups, or school groups? No   How often do you attend meetings of the clubs or  organizations you belong to? Never   Are you , , , , never , or living with a partner?    Intimate Partner Violence   Within the last year, have you been afraid of your partner or ex-partner? No   Within the last year, have you been humiliated or emotionally abused in other ways by your partner or ex-partner? No   Within the last year, have you been kicked, hit, slapped, or otherwise physically hurt by your partner or ex-partner? No   Within the last year, have you been raped or forced to have any kind of sexual activity by your partner or ex-partner? No   Alcohol Use   Q1: How often do you have a drink containing alcohol? Never   Q2: How many drinks containing alcohol do you have on a typical day when you are drinking? None   Q3: How often do you have six or more drinks on one occasion? Never   Utilities   In the past 12 months has the electric, gas, oil, or water company threatened to shut off services in your home? No   Health Literacy   How often do you need to have someone help you when you read instructions, pamphlets, or other written material from your doctor or pharmacy? Never

## 2024-08-16 NOTE — PROGRESS NOTES
Pharmacy Medication History Review    Lexy Dunaway is a 59 y.o. female. Pharmacy reviewed the patient's xhzcq-[id number] medications and allergies for accuracy.    Medications ADDED:  Saline nasal  Medications CHANGED:  Atorvastatin 80 - not taking allergy  Levetiracetam 1000mg - BID  Levetiracetam 250mg - nightly with the PM dose of 1000mg  Medications REMOVED:   None      The list below reflects the updated PTA list. Comments regarding how patient may be taking medications differently can be found in the Admit Orders Activity  Prior to Admission Medications   Prescriptions Last Dose Informant   aspirin 81 mg EC tablet 8/16/2024 Self   Sig: Take 1 tablet (81 mg) by mouth once daily.   atorvastatin (Lipitor) 80 mg tablet Not Taking Self   Sig: Take 1 tablet (80 mg) by mouth once daily at bedtime.   Patient not taking: Reported on 8/16/2024   cyanocobalamin (Vitamin B-12) 1,000 mcg tablet 8/16/2024 Self   Sig: Take 1 tablet (1,000 mcg) by mouth once daily.   levETIRAcetam (Keppra) 1,000 mg tablet 8/16/2024 Self   Sig: Take 1 tablet (1,000 mg) by mouth once daily.   Patient taking differently: Take 1 tablet (1,000 mg) by mouth 2 times a day.   levETIRAcetam (Keppra) 250 mg tablet 8/15/2024    Sig: Take 5 tablets (1,250 mg) by mouth once daily at bedtime.   Patient taking differently: Take 1 tablet (250 mg) by mouth once daily at bedtime. TAKE 1 250MG TABLET WITH 1000MG AT BEDTIME   sodium chloride (Ocean) 0.65 % nasal spray Unknown Self   Sig: Administer 1 spray into each nostril if needed for congestion.      Facility-Administered Medications: None        The list below reflects the updated allergy list. Please review each documented allergy for additional clarification and justification.  Allergies  Reviewed by Radha Michel on 8/16/2024        Severity Reactions Comments    Atorvastatin Not Specified Myalgia             Pharmacy has been [facility].    Sources used to complete the med  history include dispense history, PTA medication list, patient interview. Patient is a good historian.    Below are additional concerns with the patient's PTA list.  Levetiracetam 1000mg - BID, patient takes additional 250mg with the PM dose.     Radha Michel, Shanelle-ADV  Please reach out via "[employer] Secure Chat for questions

## 2024-08-16 NOTE — H&P
History Of Present Illness  Lexy Dunaway is a 59 y.o. with a history of remote right MCA infarct with no residual deficits as well as recent TNK use with left-sided deficits 3 months prior who presents emergency room with left-sided deficits.  Patient was at work when EMS was called due to slurred speech and left-sided weakness.  Patient apparently noted this onset 15 minutes prior to EMS arrival.  Patient otherwise has been feeling well at home with no complaints.      Past Medical History  She has a past medical history of Seizures (Multi).    Surgical History  She has no past surgical history on file.     Social History  She reports that she has been smoking cigarettes. She does not have any smokeless tobacco history on file. No history on file for alcohol use and drug use.    Family History  Denies history of premature coronary artery disease     Allergies  Atorvastatin    Review of Systems  As in history present illness, otherwise negative    Physical Exam  Alert oriented x 3  Lungs clear to auscultation bilaterally  Heart regular rhythm  Abdomen soft nontender  Extremities no leg edema  CNS left hand weakness 4 out of 5 with decreased sensation on the left side    Last Recorded Vitals  /79   Pulse 69   Temp 36.9 °C (98.5 °F)   Resp 20   Wt 101 kg (223 lb 12.3 oz)   SpO2 98%     Relevant Results  Results for orders placed or performed during the hospital encounter of 08/16/24 (from the past 24 hour(s))   POCT GLUCOSE   Result Value Ref Range    POCT Glucose 121 (H) 74 - 99 mg/dL   CBC and Auto Differential   Result Value Ref Range    WBC 8.1 4.4 - 11.3 x10*3/uL    nRBC 0.0 0.0 - 0.0 /100 WBCs    RBC 4.29 4.00 - 5.20 x10*6/uL    Hemoglobin 13.3 12.0 - 16.0 g/dL    Hematocrit 39.9 36.0 - 46.0 %    MCV 93 80 - 100 fL    MCH 31.0 26.0 - 34.0 pg    MCHC 33.3 32.0 - 36.0 g/dL    RDW 11.7 11.5 - 14.5 %    Platelets 209 150 - 450 x10*3/uL    Neutrophils % 55.7 40.0 - 80.0 %    Immature Granulocytes %,  Automated 0.2 0.0 - 0.9 %    Lymphocytes % 37.1 13.0 - 44.0 %    Monocytes % 5.0 2.0 - 10.0 %    Eosinophils % 1.4 0.0 - 6.0 %    Basophils % 0.6 0.0 - 2.0 %    Neutrophils Absolute 4.53 1.20 - 7.70 x10*3/uL    Immature Granulocytes Absolute, Automated 0.02 0.00 - 0.70 x10*3/uL    Lymphocytes Absolute 3.02 1.20 - 4.80 x10*3/uL    Monocytes Absolute 0.41 0.10 - 1.00 x10*3/uL    Eosinophils Absolute 0.11 0.00 - 0.70 x10*3/uL    Basophils Absolute 0.05 0.00 - 0.10 x10*3/uL   Comprehensive metabolic panel   Result Value Ref Range    Glucose 122 (H) 65 - 99 mg/dL    Sodium 142 133 - 145 mmol/L    Potassium 3.4 3.4 - 5.1 mmol/L    Chloride 106 97 - 107 mmol/L    Bicarbonate 24 24 - 31 mmol/L    Urea Nitrogen 10 8 - 25 mg/dL    Creatinine 0.90 0.40 - 1.60 mg/dL    eGFR 74 >60 mL/min/1.73m*2    Calcium 9.3 8.5 - 10.4 mg/dL    Albumin 4.2 3.5 - 5.0 g/dL    Alkaline Phosphatase 55 35 - 125 U/L    Total Protein 7.5 5.9 - 7.9 g/dL    AST 13 5 - 40 U/L    Bilirubin, Total 0.2 0.1 - 1.2 mg/dL    ALT 10 5 - 40 U/L    Anion Gap 12 <=19 mmol/L   Troponin T, High Sensitivity   Result Value Ref Range    Troponin T, High Sensitivity 8 <=14 ng/L   Protime-INR   Result Value Ref Range    Protime 11.3 9.3 - 12.7 seconds    INR 1.1 0.9 - 1.2   APTT   Result Value Ref Range    aPTT 27.3 22.0 - 32.5 seconds   ECG 12 lead   Result Value Ref Range    Ventricular Rate 89 BPM    Atrial Rate 89 BPM    NH Interval 144 ms    QRS Duration 82 ms    QT Interval 356 ms    QTC Calculation(Bazett) 433 ms    P Axis 119 degrees    R Axis 211 degrees    T Axis 124 degrees    QRS Count 14 beats    Q Onset 215 ms    P Onset 143 ms    P Offset 191 ms    T Offset 393 ms    QTC Fredericia 405 ms   POCT glucose   Result Value Ref Range    POCT Glucose 121 (A) 74 - 99 mg/dL            Assessment/Plan  Acute CVA with left hand weakness and left-sided numbness   Recent acute stroke with administration of TNK    Plan:  Will get an MRI of the brain  Continue aspirin  and high intensity statin  Consult neurology  PT OT evaluation and treatment  DVT prophylaxis  Wes Fraga MD

## 2024-08-16 NOTE — ED PROVIDER NOTES
ED Supervising Attending Note & Attestation   I was the Supervising Physician. I have seen face to face and examined the patient; reviewed history and physical, performed a substantive portion of the encounter, and discussed the medical decision making with the Medical Student, Resident, Fellow, PA and/or NP.     I personally saw the patient and made/approve the management plan and take responsibility for the patient management:     History/Exam limitations: none.   Additional history was obtained from EMS personnel.    HPI:       Lexy Dunaawy is a 59 y.o. with a history of remote right MCA infarct with no residual deficits as well as recent TNK use with left-sided deficits 3 months prior who presents emergency room with left-sided deficits.  Patient was at work when EMS was called due to slurred speech and left-sided weakness.  Patient apparently noted this onset 15 minutes prior to EMS arrival.  Patient otherwise has been feeling well at home with no complaints.     Last Known Well Time: 9:15 AM        ------------------------------------------------------------------------------------------------------------------------------------------     Physical Exam:    ED Triage Vitals   Temp Pulse Resp BP   -- -- -- --      SpO2 Temp src Heart Rate Source Patient Position   -- -- -- --      BP Location FiO2 (%)     -- --                  Interval: Baseline  Time: 9:40 AM  Person Administering Scale: Kenneth Sierra MD     1a  Level of consciousness: 0=alert; keenly responsive   1b. LOC questions:  0=Performs both tasks correctly   1c. LOC commands: 0=Performs both tasks correctly   2.  Best Gaze: 0=normal   3. Visual: 0=No visual loss   4. Facial Palsy: 0=Normal symmetric movement   5a. Motor left arm: 0=No drift, limb holds 90 (or 45) degrees for full 10 seconds   5b.  Motor right arm: 0=No drift, limb holds 90 (or 45) degrees for full 10 seconds   6a. motor left le=Some effort against gravity, limb cannot get to or  maintain (if cured) 90 (or 45) degrees, drifts down to bed, but has some effort against gravity   6b  Motor right le=Some effort against gravity, limb cannot get to or maintain (if cured) 90 (or 45) degrees, drifts down to bed, but has some effort against gravity   7. Limb Ataxia: 0=Absent   8.  Sensory: 1=Mild to moderate sensory loss; patient feels pinprick is less sharp or is dull on the affected side; there is a loss of superficial pain with pinprick but patient is aware She is being touched   9. Best Language:  0=No aphasia, normal   10. Dysarthria: 0=Normal   11. Extinction and Inattention: 0=No abnormality   12. Distal motor function: 0=Normal     Total:   5         VAN: VAN: Negative     ------------------------------------------------------------------------------------------------------------------------------------------     Medical Decision Making:     EKG interpreted by myself:   ED Course as of 24 1124   Fri Aug 16, 2024   0950 EKG presented to me at 9:50 AM     EKG as interpreted by me shows normal sinus rhythm at a ventricular rate of 90 bpm, normal axis, normal intervals, no STEMI.   [DH]      ED Course User Index  [DH] Kenneth Sierra MD         Diagnoses as of 24 1124   Left arm weakness          Independent Interpretation of Studies: I independently interpreted the CT head and see No obvious evidence of intracranial hemorrhage        Discussion of Management with Other Providers:   I discussed the patient/results with: Dr. Fraga, hospitalist, who will admit patient at this time for further management of left arm weakness in the setting of suspected ischemic stroke.    Lab work as interpreted by me shows no leukocytosis or anemia on CBC and otherwise CMP within normal limits.  Troponin normal, glucose within normal limits.  CT head per radiologist with no acute intracranial pathology at this time.    Patient does have prior CVA and extensive disease noted in her brain with evidence of  temporoparietal infarct.  Patient has no reported deficits from recent or prior stroke and reportedly was back at her baseline and prior to discharge in May after TNK.  Patient is not a candidate for TNK at this time as her deficits otherwise nondisabling and recently had the thrombolytics within the last 3 months.    Will admit at this time for further imaging and evaluation by neurology for disposition outpatient.    IV Thrombolysis:    No Thrombolysis contraindication reason: History of acute ischemic stroke within 3 months      Procedure  Procedures       Critical Care Procedure Note    Authorized and Performed by: Kenneth Sierra MD  Total critical care time: 31 minutes  Due to a high probability of clinically significant, life threatening deterioration, the patient required my highest level of preparedness to intervene emergently and I personally spent this critical care time directly and personally managing the patient. This critical care time included obtaining a history; examining the patient; pulse oximetry; ordering and review of studies; arranging urgent treatment with development of a management plan; evaluation of patient's response to treatment; frequent reassessment; and, discussions with other providers.  This critical care time was performed to assess and manage the high probability of imminent, life-threatening deterioration that could result in multi-organ failure. It was exclusive of separately billable procedures and treating other patients and teaching time.    Please see MDM section and the rest of the note for further information on patient assessment and treatment.      Kenneth Sierra MD  9:40 AM    Attending Emergency Physician  [facility] EMERGENCY MEDICINE           Kenneth Sierra MD  08/16/24 7142

## 2024-08-16 NOTE — PROGRESS NOTES
08/16/24 1304   Current Planned Discharge Disposition   Current Planned Discharge Disposition Home

## 2024-08-16 NOTE — ED PROVIDER NOTES
HPI   Chief Complaint   Patient presents with    Cerebrovascular Accident     Pt presents to ed via ems for complaints of possible brain attack while at work [employer] approx 0800 today. She is complaining of L sided weakness at this time. Hx of cva in may of 24       This is a 59-year-old female with a past medical history of right CVA presenting to the emergency department complaints of left-sided weakness.  Last known well was around 9:15 AM this morning.  Patient states she was at work when she began to have weakness in the left side of her body.  She states HR called 911 to bring her into the emergency department.  Patient states she has no pain.  She has weakness in her left side per patient.  Patient reportedly had slurred speech per EMS.  Patient had right CVA 3 months ago, she received TNK.       Please see HPI for pertinent positive and negative ROS.       Patient History   Past Medical History:   Diagnosis Date    Seizures (Multi)      No past surgical history on file.  No family history on file.  Social History     Tobacco Use    Smoking status: Every Day     Current packs/day: 1.00     Types: Cigarettes    Smokeless tobacco: Not on file   Vaping Use    Vaping status: Every Day   Substance Use Topics    Alcohol use: Not on file    Drug use: Not on file       Physical Exam   ED Triage Vitals   Temp Pulse Resp BP   -- -- -- --      SpO2 Temp src Heart Rate Source Patient Position   -- -- -- --      BP Location FiO2 (%)     -- --       Physical Exam  GENERAL APPEARANCE: Awake and alert. No acute respiratory distress.   VITAL SIGNS: As per the nurses' triage record.  HEENT: Normocephalic, atraumatic. Extraocular muscles are intact.   NECK: Soft, nontender and supple  CHEST: Nontender to palpation. Clear to auscultation bilaterally. Symmetric rise and fall of chest wall.   HEART: Clear S1 and S2. Regular rate and rhythm. Strong and equal pulses in the extremities.  ABDOMEN: Soft, nontender,  nondistended  MUSCULOSKELETAL: The calves are nontender to palpation. Full gross active range of motion. Ambulating on own with no acute difficulties  NEUROLOGICAL: Awake, alert and oriented x 3. Motor power intact in the upper and lower extremities. Sensation is intact to light touch in the upper and lower extremities. Patient answering questions appropriately.  NIH stroke scale 5.  IMMUNOLOGICAL: No lymphatic streaking noted  DERMATOLOGIC: Warm and dry without petechiae, rashes, or ecchymosis noted on visible skin.   PYSCH: Cooperative    ED Course & MDM   ED Course as of 24 1142   Fri Aug 16, 2024   0950 EKG presented to me at 9:50 AM     EKG as interpreted by me shows normal sinus rhythm at a ventricular rate of 90 bpm, normal axis, normal intervals, no STEMI.   [DH]      ED Course User Index  [DH] Kenneth Sierra MD         Diagnoses as of 24 1142   Left arm weakness                 No data recorded     [place] Coma Scale Score: 15 (24 1054 : Brian Paladino, RN)       NIH Stroke Scale: 2 (24 1054 : Brian Paladino, RN)                   Medical Decision Making    Last Known Well Time: 915        Interval: Baseline  Time: 11:42 AM  Person Administering Scale: Ayla Zambrano PA-C     1a  Level of consciousness: 0=alert; keenly responsive   1b. LOC questions:  0=Performs both tasks correctly   1c. LOC commands: 0=Performs both tasks correctly   2.  Best Gaze: 0=normal   3. Visual: 0=No visual loss   4. Facial Palsy: 0=Normal symmetric movement   5a. Motor left arm: 0=No drift, limb holds 90 (or 45) degrees for full 10 seconds   5b.  Motor right arm: 0=No drift, limb holds 90 (or 45) degrees for full 10 seconds   6a. motor left le=Some effort against gravity, limb cannot get to or maintain (if cured) 90 (or 45) degrees, drifts down to bed, but has some effort against gravity   6b  Motor right le=Some effort against gravity, limb cannot get to or maintain (if cured) 90 (or 45)  degrees, drifts down to bed, but has some effort against gravity   7. Limb Ataxia: 0=Absent   8.  Sensory: 1=Mild to moderate sensory loss; patient feels pinprick is less sharp or is dull on the affected side; there is a loss of superficial pain with pinprick but patient is aware She is being touched   9. Best Language:  0=No aphasia, normal   10. Dysarthria: 0=Normal   11. Extinction and Inattention: 0=No abnormality   12. Distal motor function: 0=Normal     Total:   5         VAN: VAN: Negative     ED Course as of 08/16/24 1142   Fri Aug 16, 2024   0950 EKG presented to me at 9:50 AM     EKG as interpreted by me shows normal sinus rhythm at a ventricular rate of 90 bpm, normal axis, normal intervals, no STEMI.   [DH]      ED Course User Index  [DH] Kenneth Sierra MD         Diagnoses as of 08/16/24 1142   Left arm weakness       Independent Interpretation of Studies: I independently interpreted the CT head and see No obvious evidence of intracranial hemorrhage      IV Thrombolysis: Not indicated due to patient receiving TNK in the last 3 months for previous stroke and due to symptoms improving in the emergency department.    Laboratory evaluation is grossly unremarkable.    Due to patient having left-sided weakness today with recent previous history of CVA, plan for admission for further evaluation.    The patient was evaluated in the emergency department and an etiology requiring emergent treatment or admission to hospital was identified.  The patient/family was counseled on clinical expression, expectations, and plan along with recommendations for admission.  All questions were answered and involved parties were understanding and agreeable to course of treatment.  Case was discussed with admitting physician, Dr. Fraga.  Bed type ED treatment and further ED work-up decided by joint decision making with admitting team and any consultants.  Patient stable for admission per my assessment and further management of  patient will be deferred to the inpatient setting.    Critical care time was billed at 32 minutes by me and is nonconcurrent with other providers involved.  Time excludes other separately billable procedures.  Critical care time billed due to consideration of TNK, stroke evaluation    Procedure  Procedures     Ayla Zambrano PA-C  08/16/24 1148

## 2024-08-17 VITALS
TEMPERATURE: 97.9 F | HEART RATE: 61 BPM | HEIGHT: 62 IN | OXYGEN SATURATION: 94 % | BODY MASS INDEX: 42.31 KG/M2 | WEIGHT: 229.94 LBS | DIASTOLIC BLOOD PRESSURE: 73 MMHG | SYSTOLIC BLOOD PRESSURE: 110 MMHG | RESPIRATION RATE: 18 BRPM

## 2024-08-17 PROBLEM — R29.898 LEFT ARM WEAKNESS: Status: RESOLVED | Noted: 2024-08-16 | Resolved: 2024-08-17

## 2024-08-17 LAB
ATRIAL RATE: 89 BPM
GLUCOSE BLD MANUAL STRIP-MCNC: 111 MG/DL (ref 74–99)
GLUCOSE BLD MANUAL STRIP-MCNC: 87 MG/DL (ref 74–99)
HOLD SPECIMEN: NORMAL
HOLD SPECIMEN: NORMAL
LEVETIRACETAM SERPL-MCNC: 11 UG/ML (ref 10–40)
P AXIS: 119 DEGREES
P OFFSET: 191 MS
P ONSET: 143 MS
PR INTERVAL: 144 MS
Q ONSET: 215 MS
QRS COUNT: 14 BEATS
QRS DURATION: 82 MS
QT INTERVAL: 356 MS
QTC CALCULATION(BAZETT): 433 MS
QTC FREDERICIA: 405 MS
R AXIS: 211 DEGREES
T AXIS: 124 DEGREES
T OFFSET: 393 MS
VENTRICULAR RATE: 89 BPM

## 2024-08-17 PROCEDURE — 36415 COLL VENOUS BLD VENIPUNCTURE: CPT | Performed by: STUDENT IN AN ORGANIZED HEALTH CARE EDUCATION/TRAINING PROGRAM

## 2024-08-17 PROCEDURE — 2500000001 HC RX 250 WO HCPCS SELF ADMINISTERED DRUGS (ALT 637 FOR MEDICARE OP): Performed by: FAMILY MEDICINE

## 2024-08-17 PROCEDURE — 2500000004 HC RX 250 GENERAL PHARMACY W/ HCPCS (ALT 636 FOR OP/ED): Performed by: FAMILY MEDICINE

## 2024-08-17 PROCEDURE — 82947 ASSAY GLUCOSE BLOOD QUANT: CPT

## 2024-08-17 PROCEDURE — 80177 DRUG SCRN QUAN LEVETIRACETAM: CPT | Mod: WESLAB | Performed by: STUDENT IN AN ORGANIZED HEALTH CARE EDUCATION/TRAINING PROGRAM

## 2024-08-17 PROCEDURE — 99223 1ST HOSP IP/OBS HIGH 75: CPT | Performed by: STUDENT IN AN ORGANIZED HEALTH CARE EDUCATION/TRAINING PROGRAM

## 2024-08-17 RX ORDER — ATORVASTATIN CALCIUM 80 MG/1
40 TABLET, FILM COATED ORAL NIGHTLY
Qty: 30 TABLET | Refills: 1 | Status: SHIPPED | OUTPATIENT
Start: 2024-08-17

## 2024-08-17 RX ORDER — LEVETIRACETAM 250 MG/1
250 TABLET ORAL NIGHTLY
Start: 2024-08-17

## 2024-08-17 RX ORDER — LEVETIRACETAM 1000 MG/1
1000 TABLET ORAL 2 TIMES DAILY
Qty: 60 TABLET | Refills: 1 | Status: SHIPPED | OUTPATIENT
Start: 2024-08-17

## 2024-08-17 RX ORDER — ATORVASTATIN CALCIUM 80 MG/1
80 TABLET, FILM COATED ORAL NIGHTLY
Qty: 30 TABLET | Refills: 1 | Status: SHIPPED | OUTPATIENT
Start: 2024-08-17 | End: 2024-08-17

## 2024-08-17 RX ORDER — LANOLIN ALCOHOL/MO/W.PET/CERES
1000 CREAM (GRAM) TOPICAL DAILY
Status: DISCONTINUED | OUTPATIENT
Start: 2024-08-17 | End: 2024-08-17 | Stop reason: HOSPADM

## 2024-08-17 RX ORDER — ASPIRIN 81 MG/1
81 TABLET ORAL DAILY
Status: DISCONTINUED | OUTPATIENT
Start: 2024-08-17 | End: 2024-08-17 | Stop reason: HOSPADM

## 2024-08-17 RX ORDER — LEVETIRACETAM 500 MG/1
1000 TABLET ORAL DAILY
Status: DISCONTINUED | OUTPATIENT
Start: 2024-08-17 | End: 2024-08-17 | Stop reason: HOSPADM

## 2024-08-17 RX ORDER — ATORVASTATIN CALCIUM 80 MG/1
80 TABLET, FILM COATED ORAL NIGHTLY
Status: DISCONTINUED | OUTPATIENT
Start: 2024-08-17 | End: 2024-08-17 | Stop reason: HOSPADM

## 2024-08-17 ASSESSMENT — COGNITIVE AND FUNCTIONAL STATUS - GENERAL
DAILY ACTIVITIY SCORE: 24
MOBILITY SCORE: 24

## 2024-08-17 ASSESSMENT — PAIN SCALES - GENERAL: PAINLEVEL_OUTOF10: 0 - NO PAIN

## 2024-08-17 NOTE — DISCHARGE SUMMARY
Discharge Diagnosis  Left arm weakness    Issues Requiring Follow-Up      Discharge Meds     Your medication list        START taking these medications        Instructions Last Dose Given Next Dose Due   atorvastatin 80 mg tablet  Commonly known as: Lipitor      Take 1 tablet (80 mg) by mouth once daily at bedtime.              CHANGE how you take these medications        Instructions Last Dose Given Next Dose Due   levETIRAcetam 250 mg tablet  Commonly known as: Keppra  What changed:   how much to take  additional instructions      Take 1 tablet (250 mg) by mouth once daily at bedtime. In addition to 1000 mg tablet       levETIRAcetam 1,000 mg tablet  Commonly known as: Keppra  What changed: when to take this      Take 1 tablet (1,000 mg) by mouth 2 times a day.              CONTINUE taking these medications        Instructions Last Dose Given Next Dose Due   aspirin 81 mg EC tablet      Take 1 tablet (81 mg) by mouth once daily.       cyanocobalamin 1,000 mcg tablet  Commonly known as: Vitamin B-12      Take 1 tablet (1,000 mcg) by mouth once daily.       sodium chloride 0.65 % nasal spray  Commonly known as: Ocean                     Where to Get Your Medications        These medications were sent to [facility]  [address] [facility] [address]      Hours: 9 AM to 6 PM Mon-Fri, 9 AM to 1 PM Sat Phone: [phone]   atorvastatin 80 mg tablet  levETIRAcetam 1,000 mg tablet       Information about where to get these medications is not yet available    Ask your nurse or doctor about these medications  levETIRAcetam 250 mg tablet         Test Results Pending At Discharge  Pending Labs       Order Current Status    Extra Tubes In process    Lavender Top In process    Levetiracetam In process    PST Top In process            Hospital Course   Presented to ER 8/16 with left sided numbness and tremors. CT scan showed no acute intracranial abnormalities. Neurology consulted. MRI of brain showed no  acute stroke. No medications changes. Neurology to follow up with Keppra level. Cleared for discharge by consultants.     Pertinent Physical Exam At Time of Discharge  Physical Exam  Constitutional:       Appearance: Normal appearance.   HENT:      Head: Normocephalic.      Mouth/Throat:      Mouth: Mucous membranes are moist.      Pharynx: Oropharynx is clear.   Eyes:      Extraocular Movements: Extraocular movements intact.      Conjunctiva/sclera: Conjunctivae normal.      Pupils: Pupils are equal, round, and reactive to light.   Cardiovascular:      Rate and Rhythm: Normal rate and regular rhythm.      Pulses: Normal pulses.      Heart sounds: Normal heart sounds.   Pulmonary:      Effort: Pulmonary effort is normal.      Breath sounds: Normal breath sounds.   Abdominal:      General: Bowel sounds are normal.      Palpations: Abdomen is soft.      Tenderness: There is no abdominal tenderness.   Musculoskeletal:         General: Normal range of motion.      Cervical back: Normal range of motion and neck supple.   Skin:     General: Skin is warm and dry.      Capillary Refill: Capillary refill takes less than 2 seconds.   Neurological:      General: No focal deficit present.      Mental Status: She is alert and oriented to person, place, and time.   Psychiatric:         Mood and Affect: Mood normal.         Behavior: Behavior normal.         Outpatient Follow-Up  No future appointments.      Cynthia Freitas, APRN-CNP

## 2024-08-17 NOTE — CONSULTS
Inpatient consult to Neurology  Consult performed by: Rosalva Abraham MD  Consult ordered by: Wes Fraga MD          History Of Present Illness  Lexy Dunaway is a 59 y.o. female with remote hx of RMCA (temporal lobe mostly) stroke and seizures on keppra 1000 / 1250mg presenting with L sided weakness.    Pt states on the day of presentation she noticed her L hand was weak, she kept dropping items out of her L hand. She also noticed L side of her face was numb. This was similar to her old stroke symptoms, but milder. Pt came to ED for evaluation. MRI subsequently did not reveral any new strokes. Sx spontaneously resolved.     Note pt had essentially the same sx in 5/2024. She came to ED and got TNK at that time. Subsequently MRI was negative.    Pt states she has had stroke in 2021 in [place]. She was told she has had seizures over there but she does not recall. She knows she's had a seizure when she has a passing out event and wake up with whole body soreness. This has not happened recently.     Pt does admit to poor sleep recently.     Stroke evaluation:   MRI: 5/2024 and 8/16/2024 reviewed and significant for encephalomalacia of R temporal lobe   CTA 5/2024: no significant large vessel stenosis intracranially or extracranially   Echo: no echo on file   LDL: 135 on 5/6/2024  HbA1C: 5.2 on 5/6/2024   Heart monitor: reportedly done in 2021, negative for afib reportedly   BP 100s/70s  BMI 42.06  Daily vaping, quit smoking 3/2024   On daily asa     Keppra level 26 on 5/12/23   No new keppra level (pending)      Past Medical History  Past Medical History:   Diagnosis Date    Seizures (Multi)      Surgical History  No past surgical history on file.  Social History  Social History     Tobacco Use    Smoking status: Every Day     Current packs/day: 1.00     Types: Cigarettes    Smokeless tobacco: Never   Vaping Use    Vaping status: Every Day     Allergies  Atorvastatin  Medications Prior to Admission   Medication Sig  Dispense Refill Last Dose    aspirin 81 mg EC tablet Take 1 tablet (81 mg) by mouth once daily. 90 tablet 0 8/16/2024    cyanocobalamin (Vitamin B-12) 1,000 mcg tablet Take 1 tablet (1,000 mcg) by mouth once daily. 30 tablet 0 8/16/2024    levETIRAcetam (Keppra) 1,000 mg tablet Take 1 tablet (1,000 mg) by mouth once daily. (Patient taking differently: Take 1 tablet (1,000 mg) by mouth 2 times a day.) 30 tablet 0 8/16/2024    levETIRAcetam (Keppra) 250 mg tablet Take 5 tablets (1,250 mg) by mouth once daily at bedtime. (Patient taking differently: Take 1 tablet (250 mg) by mouth once daily at bedtime. TAKE 1 250MG TABLET WITH 1000MG AT BEDTIME) 150 tablet 0 8/15/2024    atorvastatin (Lipitor) 80 mg tablet Take 1 tablet (80 mg) by mouth once daily at bedtime. (Patient not taking: Reported on 8/16/2024) 90 tablet 0 Not Taking    sodium chloride (Ocean) 0.65 % nasal spray Administer 1 spray into each nostril if needed for congestion.   Unknown       Review of Systems  Neurological Exam  MENTAL STATUS:  General appearance: resting in bed, in NAD  Orientation: [place] to self, time, place and condition   Language: Expression, repetition, naming, comprehension intact. Follows inverse commands  Thought processes: Logical, organized  Concentration: Intact  Fund of knowledge: Appropriate    CRANIAL NERVES:  - Fundoscopic exam: Deferred   - II/III: PERRL  - II:  Visual fields intact to confrontation bilaterally   - III, IV, VI: EOMI to pursuit without nystagmus  - V: V1-V3 sensation intact bilaterally  - VII: Face muscles symmetric with smile and eye closure  - VIII: Intact to finger rub  - IX, X: Palate elevated symmetrically bilaterally, no hoarseness  - XI: 5/5 strength on shoulder shrugging bilaterally  - XII: Tongue midline without atrophy or fasciculation    MOTOR: Tone and bulk normal in all extremities    STRENGTH: R L  Deltoid  5 5  Biceps  5 5  Triceps  5 5    5 5  Finger Abd 5 5  Hip flexion 5 5  Quadriceps 5  "5  Hamstrings 5 5  DorsiFlex 5 5  PlantarFlex 5 5    REFLEXES: R L  Biceps  +2 +2  Triceps  +2 +2  Brachioradialis +2 +2  Patellar  +2 +2  Achilles  +1 +1  Plantar  Down Down    No Trinh, crossed adductors or clonus    COORDINATION: Intact on finger to nose bl, intact on heel to shin bl, JIMBO intact bl  SENSORY: Intact to light touch in BUE and BLE  ROMBERG: Negative  GAIT: Normal stance, cautious walking but with narrow base.     Physical Exam  Last Recorded Vitals  Blood pressure 106/74, pulse 72, temperature 36.4 °C (97.5 °F), temperature source Temporal, resp. rate 18, height 1.575 m (5' 2\"), weight 104 kg (229 lb 15 oz), SpO2 95%.    Relevant Results        NIH Stroke Scale  1A. Level of Consciousness: Alert, Keenly Responsive  1B. Ask Month and Age: Both Questions Right  1C. Blink Eyes & Squeeze Hands: Performs Both Tasks  2. Best Gaze: Normal  3. Visual: No Visual Loss  4. Facial Palsy: Normal Symmetrical Movements  5A. Motor - Left Arm: No Drift  5B. Motor - Right Arm: No Drift  6A. Motor - Left Leg: No Drift  6B. Motor - Right Leg: No Drift  7. Limb Ataxia: Present in One Limb  8. Sensory Loss: Normal  9. Best Language: No Aphasia  10. Dysarthria: Normal  11. Extinction and Inattention: No Abnormality  NIH Stroke Scale: 1           [place] Coma Scale  Best Eye Response: Spontaneous  Best Verbal Response: Oriented  Best Motor Response: Follows commands  [place] Coma Scale Score: 15                 I have personally reviewed the following imaging results MR brain wo IV contrast    Result Date: 8/16/2024  Interpreted By:  Skyler Donohue, STUDY: MR BRAIN WO IV CONTRAST;  8/16/2024 8:02 pm   INDICATION: Signs/Symptoms:stroke.   COMPARISON: MRI brain of 05/06/2024. Correlation also made to noncontrast head CT of 08/16/2024.   ACCESSION NUMBER(S): [id number]   ORDERING CLINICIAN: MARI INMAN   TECHNIQUE: Axial T2, FLAIR, DWI, gradient echo T2 and sagittal and coronal T1 weighted images of brain were acquired.   " FINDINGS: CSF Spaces: Redemonstrated vacuo dilation of the temporal horn and atrium of the right lateral ventricle. No definite hydrocephalus. No abnormal extra-axial collection.   Parenchyma: Moderate diffuse volume loss. Redemonstrated right temporoparietal encephalomalacia. There is no diffusion restriction abnormality to suggest acute infarct.  There is no focal parenchymal signal abnormality.  There is no mass effect or midline shift.   Paranasal Sinuses and Mastoids: Visualized paranasal sinuses and mastoid air cells are unremarkable.       No evidence of acute infarct, intracranial mass effect or midline shift. Redemonstrated right temporoparietal encephalomalacia.   MACRO: None   Signed by: Skyler Donohue 8/16/2024 8:23 PM Dictation workstation:   [id number]    CT brain attack head wo IV contrast    Result Date: 8/16/2024  Interpreted By:  Wilmer Puente, STUDY: CT BRAIN ATTACK HEAD WO IV CONTRAST;  8/16/2024 9:44 am   INDICATION: Signs/Symptoms:Stroke Evaluation.   COMPARISON: December 27, 2023, May 6, 2024, May 7, 2024 head CT, and May 6, 2024 MRI brain   ACCESSION NUMBER(S): [id number]   ORDERING CLINICIAN: LISA DANIELS   TECHNIQUE: Noncontrast axial CT scan of head was performed. Angled reformats in brain and bone windows were generated. The images were reviewed in bone, brain, blood and soft tissue windows.   FINDINGS: CSF Spaces: Ventricles, sulci, and cisterns are similar including ex vacuo dilation of portion of the right lateral ventricle related to prior infarcts. There is no acute extraaxial fluid collection.   Parenchyma: Similar regional encephalomalacia right temporoparietal region compatible with remote infarct. No acute intra, or extra-axial fluid collection, mass, or mass effect. There is normal gray-white differentiation.   Calvarium: The calvarium is unremarkable.   Paranasal sinuses and mastoids: Visualized paranasal sinuses and mastoids are clear.       No evidence of acute cortical  "infarct or intracranial hemorrhage.   Similar appearance related to remote right temporoparietal infarct.   MACRO: Wilmer Puente discussed the significance and urgency of this critical finding by secure chat with  LISA DANIELS on 8/16/2024 at 10:30 am.  (**-RCF-**) Findings:  See findings.     Signed by: Wilmer Puente 8/16/2024 10:31 AM Dictation workstation:   [id number]    ECG 12 lead    Result Date: 8/16/2024  Suspect arm lead reversal, interpretation assumes no reversal Normal sinus rhythm Normal ECG When compared with ECG of 06-MAY-2024 09:29, Fusion complexes are no longer Present Premature ventricular complexes are no longer Present QRS axis Shifted left  .      Assessment/Plan   Assessment & Plan  Left arm weakness      59yoF with hx of RMCA inferior division stroke in 2021 presenting with L face numbness and L hand weakness. This sx is similar to but milder than her original stroke sx in 2021. Sx has spontaneously resolved. Her seizures are usually generalized (\"passes out\", wake up with whole body soreness) and she has not had any new seizures recently.    #Recrudescence of old stroke sx     - no further stroke evaluation necessary   - no medication changes necessary   - pt educated on recrudescence phenomenon after stroke   - will check keppra level today (will follow once resulted)        I spent 80 minutes in the professional and overall care of this patient.      Rosalva Abraham MD  "

## 2024-08-17 NOTE — CARE PLAN
The patient's goals for the shift include      The clinical goals for the shift include neuro checks    Over the shift, the patient did not make progress toward the following goals. Barriers to progression include none. Recommendations to address these barriers include none.

## 2024-08-17 NOTE — PROGRESS NOTES
Occupational Therapy                 Therapy Communication Note    Patient Name: Lexy Dunaway  MRN: [id number]  Today's Date: 8/17/2024     Discipline: Occupational Therapy    Missed Visit Reason: Missed Visit Reason: Other (Comment) (OT referral received, per nurse, patient with discharge orders , has been moving in the room indepedently and has no OT needs. Screened patient this date in the room, patient reports she is back to normal  no OT needs, observed patient walking.) Discontinue OT.     Missed Time: Attempt    Comment:Patient with no OT needs identified from screen, no OT evaluation warranted this date, discontinue OT.

## 2024-08-17 NOTE — PROGRESS NOTES
Physical Therapy                 Therapy Communication Note - Screen    Patient Name: Lexy Dunaway  MRN: [id number]  Today's Date: 8/17/2024     Discipline: Physical Therapy    Comment: Pt presented to ED from work for L sided weakness & was admitted for potential CVA. MRI brain (8/16/24) negative for acute processes. PM included seizures and CVA (R MCA, May 2024, with TNK administration) without residual impairments. Pt received seated EOB. She denied any concerns regarding functional mobility within her home environment. Per OT, pt was observed ambulating within her room independently. No acute care skilled PT needs identified. Ordered completed.    Non-billable time for screen at 10:56.

## 2024-08-17 NOTE — CARE PLAN
The patient's goals for the shift include      The clinical goals for the shift include neuro checks      Problem: Safety - Adult  Goal: Free from fall injury  8/17/2024 0020 by Veda Tipton RN  Outcome: Progressing  8/17/2024 0020 by Veda Tipton RN  Outcome: Progressing     Problem: Pain - Adult  Goal: Verbalizes/displays adequate comfort level or baseline comfort level  8/17/2024 0020 by Veda Tipton RN  Outcome: Progressing  8/17/2024 0020 by Veda Tipton RN  Outcome: Progressing

## 2024-08-17 NOTE — PROGRESS NOTES
Discharge instructions provided to patient and reviewed. All questions answered. RX sent to pharmacy of choice. Belongings returned. IV and heart monitor removed, pt stable for discharge.

## 2024-11-10 ENCOUNTER — HOSPITAL ENCOUNTER (EMERGENCY)
Facility: HOSPITAL | Age: 59
Discharge: HOME | End: 2024-11-10
Attending: EMERGENCY MEDICINE

## 2024-11-10 VITALS
DIASTOLIC BLOOD PRESSURE: 77 MMHG | HEIGHT: 62 IN | HEART RATE: 97 BPM | WEIGHT: 217.15 LBS | OXYGEN SATURATION: 96 % | RESPIRATION RATE: 18 BRPM | TEMPERATURE: 97.7 F | BODY MASS INDEX: 39.96 KG/M2 | SYSTOLIC BLOOD PRESSURE: 112 MMHG

## 2024-11-10 DIAGNOSIS — R56.9 SEIZURE (MULTI): Primary | ICD-10-CM

## 2024-11-10 LAB
ALBUMIN SERPL BCP-MCNC: 4.3 G/DL (ref 3.4–5)
ALP SERPL-CCNC: 50 U/L (ref 33–110)
ALT SERPL W P-5'-P-CCNC: 12 U/L (ref 7–45)
ANION GAP SERPL CALCULATED.3IONS-SCNC: 21 MMOL/L (ref 10–20)
APPEARANCE UR: CLEAR
AST SERPL W P-5'-P-CCNC: 12 U/L (ref 9–39)
BASOPHILS # BLD AUTO: 0.04 X10*3/UL (ref 0–0.1)
BASOPHILS NFR BLD AUTO: 0.4 %
BILIRUB SERPL-MCNC: 1 MG/DL (ref 0–1.2)
BILIRUB UR STRIP.AUTO-MCNC: NEGATIVE MG/DL
BUN SERPL-MCNC: 11 MG/DL (ref 6–23)
CALCIUM SERPL-MCNC: 9.3 MG/DL (ref 8.6–10.3)
CHLORIDE SERPL-SCNC: 102 MMOL/L (ref 98–107)
CO2 SERPL-SCNC: 20 MMOL/L (ref 21–32)
COLOR UR: ABNORMAL
CREAT SERPL-MCNC: 1.13 MG/DL (ref 0.5–1.05)
EGFRCR SERPLBLD CKD-EPI 2021: 56 ML/MIN/1.73M*2
EOSINOPHIL # BLD AUTO: 0.12 X10*3/UL (ref 0–0.7)
EOSINOPHIL NFR BLD AUTO: 1.1 %
ERYTHROCYTE [DISTWIDTH] IN BLOOD BY AUTOMATED COUNT: 11.9 % (ref 11.5–14.5)
GLUCOSE SERPL-MCNC: 128 MG/DL (ref 74–99)
GLUCOSE UR STRIP.AUTO-MCNC: NORMAL MG/DL
HCT VFR BLD AUTO: 41.1 % (ref 36–46)
HGB BLD-MCNC: 13.4 G/DL (ref 12–16)
IMM GRANULOCYTES # BLD AUTO: 0.03 X10*3/UL (ref 0–0.7)
IMM GRANULOCYTES NFR BLD AUTO: 0.3 % (ref 0–0.9)
KETONES UR STRIP.AUTO-MCNC: ABNORMAL MG/DL
LEUKOCYTE ESTERASE UR QL STRIP.AUTO: NEGATIVE
LYMPHOCYTES # BLD AUTO: 3.37 X10*3/UL (ref 1.2–4.8)
LYMPHOCYTES NFR BLD AUTO: 31.2 %
MCH RBC QN AUTO: 30.6 PG (ref 26–34)
MCHC RBC AUTO-ENTMCNC: 32.6 G/DL (ref 32–36)
MCV RBC AUTO: 94 FL (ref 80–100)
MONOCYTES # BLD AUTO: 0.75 X10*3/UL (ref 0.1–1)
MONOCYTES NFR BLD AUTO: 7 %
MUCOUS THREADS #/AREA URNS AUTO: NORMAL /LPF
NEUTROPHILS # BLD AUTO: 6.48 X10*3/UL (ref 1.2–7.7)
NEUTROPHILS NFR BLD AUTO: 60 %
NITRITE UR QL STRIP.AUTO: NEGATIVE
NRBC BLD-RTO: 0 /100 WBCS (ref 0–0)
PH UR STRIP.AUTO: 6 [PH]
PLATELET # BLD AUTO: 191 X10*3/UL (ref 150–450)
POTASSIUM SERPL-SCNC: 3.4 MMOL/L (ref 3.5–5.3)
PROT SERPL-MCNC: 7.8 G/DL (ref 6.4–8.2)
PROT UR STRIP.AUTO-MCNC: ABNORMAL MG/DL
RBC # BLD AUTO: 4.38 X10*6/UL (ref 4–5.2)
RBC # UR STRIP.AUTO: NEGATIVE /UL
RBC #/AREA URNS AUTO: NORMAL /HPF
SODIUM SERPL-SCNC: 140 MMOL/L (ref 136–145)
SP GR UR STRIP.AUTO: 1.01
SQUAMOUS #/AREA URNS AUTO: NORMAL /HPF
UROBILINOGEN UR STRIP.AUTO-MCNC: NORMAL MG/DL
WBC # BLD AUTO: 10.8 X10*3/UL (ref 4.4–11.3)
WBC #/AREA URNS AUTO: NORMAL /HPF

## 2024-11-10 PROCEDURE — 2500000004 HC RX 250 GENERAL PHARMACY W/ HCPCS (ALT 636 FOR OP/ED): Performed by: EMERGENCY MEDICINE

## 2024-11-10 PROCEDURE — 96360 HYDRATION IV INFUSION INIT: CPT

## 2024-11-10 PROCEDURE — 99284 EMERGENCY DEPT VISIT MOD MDM: CPT | Mod: 25

## 2024-11-10 PROCEDURE — 99283 EMERGENCY DEPT VISIT LOW MDM: CPT

## 2024-11-10 PROCEDURE — 81001 URINALYSIS AUTO W/SCOPE: CPT | Performed by: EMERGENCY MEDICINE

## 2024-11-10 PROCEDURE — 85025 COMPLETE CBC W/AUTO DIFF WBC: CPT | Performed by: EMERGENCY MEDICINE

## 2024-11-10 PROCEDURE — 80053 COMPREHEN METABOLIC PANEL: CPT | Performed by: EMERGENCY MEDICINE

## 2024-11-10 PROCEDURE — 36415 COLL VENOUS BLD VENIPUNCTURE: CPT | Performed by: EMERGENCY MEDICINE

## 2024-11-10 RX ORDER — LEVETIRACETAM 1000 MG/1
1000 TABLET ORAL 2 TIMES DAILY
Qty: 60 TABLET | Refills: 0 | Status: SHIPPED | OUTPATIENT
Start: 2024-11-10

## 2024-11-10 ASSESSMENT — PAIN - FUNCTIONAL ASSESSMENT: PAIN_FUNCTIONAL_ASSESSMENT: 0-10

## 2024-11-10 ASSESSMENT — PAIN SCALES - GENERAL
PAINLEVEL_OUTOF10: 0 - NO PAIN

## 2024-11-10 ASSESSMENT — COLUMBIA-SUICIDE SEVERITY RATING SCALE - C-SSRS
6. HAVE YOU EVER DONE ANYTHING, STARTED TO DO ANYTHING, OR PREPARED TO DO ANYTHING TO END YOUR LIFE?: NO
2. HAVE YOU ACTUALLY HAD ANY THOUGHTS OF KILLING YOURSELF?: NO
1. IN THE PAST MONTH, HAVE YOU WISHED YOU WERE DEAD OR WISHED YOU COULD GO TO SLEEP AND NOT WAKE UP?: NO

## 2024-11-10 NOTE — ED PROVIDER NOTES
HPI   Chief Complaint   Patient presents with    Seizures      Seizure noted by family. 2 per the squad. Versed given by the squad.        HPI  59-year-old female presents with complaint of a seizure.  Patient is a history of seizures tonight was sitting at the table when she had witnessed seizure by her relatives.  Brought in by EMS.  Patient takes Keppra for seizures.  States has been taking her medications as prescribed.  Denies any recent illness.  No trauma associated with the seizure.  She states her last seizure she thinks was May.  No other complaints.      Patient History   Past Medical History:   Diagnosis Date    Seizures (Multi)      History reviewed. No pertinent surgical history.  No family history on file.  Social History     Tobacco Use    Smoking status: Every Day     Current packs/day: 1.00     Types: Cigarettes    Smokeless tobacco: Never   Vaping Use    Vaping status: Every Day   Substance Use Topics    Alcohol use: Not on file    Drug use: Not on file       Physical Exam   ED Triage Vitals [11/10/24 1815]   Temperature Heart Rate Respirations BP   36.5 °C (97.7 °F) 100 18 123/88      Pulse Ox Temp Source Heart Rate Source Patient Position   94 % Oral Monitor Lying      BP Location FiO2 (%)     Right arm --       Physical Exam  General:  Awake, alert, no acute distress.  Head: Normocephalic, Atraumatic  Neck: Supple, trachea midline, no stridor  Skin: Warm and dry, no rashes   Lungs: Clear to auscultation bilaterally no acute respiratory distress, speaking in full sentences without difficulty  CV: Regular Rate Rhythm with no obvious murmurs gallops rubs noted, no jugular venous distention, no pedal edema   Abdomen: Soft, nontender, nondistended, positive bowel sounds, no peritoneal signs  Neuro:  No gross focal neurologic deficits, NIH is 0  Musculoskeletal:  Full range of motion in all 4 extremities  Psychiatric:  Alert oriented x 3, Good insight into condition.    ED Course & MDM   Diagnoses as  of 11/10/24 2023   Seizure (Multi)                 No data recorded                                 Medical Decision Making  Patient's workup in the ED is unremarkable she had no return of her symptoms.  She did tell the nurse that she had some left-sided numbness which she did not mention to me which is since resolved.  I reviewed her visit in August and she also had left-sided numbness at that time as well.  At this point do not feel she requires admission to the hospital for this breakthrough seizure.  Discussed all the findings with her bedside.  She is advised to follow-up with her neurologist, take her medications as prescribed, return if condition should worsen.  She is stable for discharge.    Procedure  Procedures     Domenico Dorman, DO  11/10/24 2024

## 2024-11-11 LAB — HOLD SPECIMEN: NORMAL

## 2024-11-11 NOTE — DISCHARGE INSTRUCTIONS
[facility]. It was my pleasure to be involved in your care today.         As of today's visit, based on reasonable likelihood, that it is safe for you to be discharged back to your residence to follow-up as an outpatient for ongoing management of your medical problem. You should follow-up with any referrals / primary provider as soon as possible. The contacts (number, addresses) are listed below.         Important:  Even though we think it is safe for you to go home, there is always a small chance that we are missing something that could require hospitalization.  Therefore it is very important that if you get worse or develops any new symptoms that you return here as soon as possible to be re-evaluated.  This includes return of symptoms that have resolved such as fainting, chest pain, or symptoms that could be warning signs for stroke important:  Even though we think it is safe for you to go home, there is always a small chance that we are missing something that could require hospitalization.  Therefore it is very important that if you get worse or develops any new symptoms that you return here as soon as possible to be re-evaluated.  This includes return of symptoms that have resolved such as fainting, chest pain, or symptoms that could be warning signs for stroke         Make sure your pharmacy and primary doctor is aware of any new medications prescribed today.          It is your responsibility to contact as soon as possible, and follow through with, any referrals you were given today. We do recommend you inform them you are a [facility] follow-up patient, as often they can better accommodate your need to be seen, provided their schedules allow. We will, and have, made every effort to ensure you have access to adequate follow-up specialists available.          All problems may not be able to be fixed in one ER visit. This is why timely ongoing care is important, and this  is a responsibility you share in. Further, you are free to follow up with any provider you choose, and this is not limited to our suggestion.          If cultures were obtained today, you will be contacted should anything result that would require further treatment. Please contact the ED at the number provided with questions.          Having trouble affording medications? Try [email or web address]! (This is not a hospital endorsed website, merely a recommendation based on my own personal experiences with [facility])

## 2025-04-25 ENCOUNTER — HOSPITAL ENCOUNTER (EMERGENCY)
Facility: HOSPITAL | Age: 60
Discharge: HOME | End: 2025-04-25
Attending: EMERGENCY MEDICINE
Payer: COMMERCIAL

## 2025-04-25 ENCOUNTER — APPOINTMENT (OUTPATIENT)
Dept: RADIOLOGY | Facility: HOSPITAL | Age: 60
End: 2025-04-25
Payer: COMMERCIAL

## 2025-04-25 VITALS
WEIGHT: 220 LBS | TEMPERATURE: 98.6 F | BODY MASS INDEX: 40.48 KG/M2 | SYSTOLIC BLOOD PRESSURE: 143 MMHG | OXYGEN SATURATION: 95 % | HEART RATE: 100 BPM | RESPIRATION RATE: 20 BRPM | HEIGHT: 62 IN | DIASTOLIC BLOOD PRESSURE: 96 MMHG

## 2025-04-25 DIAGNOSIS — R20.2 ARM PARESTHESIA, LEFT: Primary | ICD-10-CM

## 2025-04-25 LAB
ALBUMIN SERPL BCP-MCNC: 4.1 G/DL (ref 3.4–5)
ALP SERPL-CCNC: 69 U/L (ref 33–110)
ALT SERPL W P-5'-P-CCNC: 20 U/L (ref 7–45)
ANION GAP SERPL CALCULATED.3IONS-SCNC: 11 MMOL/L (ref 10–20)
AST SERPL W P-5'-P-CCNC: 19 U/L (ref 9–39)
BASOPHILS # BLD AUTO: 0.04 X10*3/UL (ref 0–0.1)
BASOPHILS NFR BLD AUTO: 0.5 %
BILIRUB SERPL-MCNC: 0.5 MG/DL (ref 0–1.2)
BUN SERPL-MCNC: 6 MG/DL (ref 6–23)
CALCIUM SERPL-MCNC: 9.4 MG/DL (ref 8.6–10.3)
CHLORIDE SERPL-SCNC: 107 MMOL/L (ref 98–107)
CO2 SERPL-SCNC: 27 MMOL/L (ref 21–32)
CREAT SERPL-MCNC: 0.83 MG/DL (ref 0.5–1.05)
EGFRCR SERPLBLD CKD-EPI 2021: 81 ML/MIN/1.73M*2
EOSINOPHIL # BLD AUTO: 0.17 X10*3/UL (ref 0–0.7)
EOSINOPHIL NFR BLD AUTO: 2.2 %
ERYTHROCYTE [DISTWIDTH] IN BLOOD BY AUTOMATED COUNT: 12.2 % (ref 11.5–14.5)
ETHANOL SERPL-MCNC: <10 MG/DL
GLUCOSE SERPL-MCNC: 84 MG/DL (ref 74–99)
HCT VFR BLD AUTO: 38.4 % (ref 36–46)
HGB BLD-MCNC: 12.9 G/DL (ref 12–16)
IMM GRANULOCYTES # BLD AUTO: 0.02 X10*3/UL (ref 0–0.7)
IMM GRANULOCYTES NFR BLD AUTO: 0.3 % (ref 0–0.9)
LYMPHOCYTES # BLD AUTO: 2.79 X10*3/UL (ref 1.2–4.8)
LYMPHOCYTES NFR BLD AUTO: 35.8 %
MAGNESIUM SERPL-MCNC: 1.96 MG/DL (ref 1.6–2.4)
MCH RBC QN AUTO: 30 PG (ref 26–34)
MCHC RBC AUTO-ENTMCNC: 33.6 G/DL (ref 32–36)
MCV RBC AUTO: 89 FL (ref 80–100)
MONOCYTES # BLD AUTO: 0.66 X10*3/UL (ref 0.1–1)
MONOCYTES NFR BLD AUTO: 8.5 %
NEUTROPHILS # BLD AUTO: 4.12 X10*3/UL (ref 1.2–7.7)
NEUTROPHILS NFR BLD AUTO: 52.7 %
NRBC BLD-RTO: 0 /100 WBCS (ref 0–0)
PLATELET # BLD AUTO: 235 X10*3/UL (ref 150–450)
POTASSIUM SERPL-SCNC: 3.4 MMOL/L (ref 3.5–5.3)
PROT SERPL-MCNC: 7.7 G/DL (ref 6.4–8.2)
RBC # BLD AUTO: 4.3 X10*6/UL (ref 4–5.2)
SODIUM SERPL-SCNC: 142 MMOL/L (ref 136–145)
WBC # BLD AUTO: 7.8 X10*3/UL (ref 4.4–11.3)

## 2025-04-25 PROCEDURE — 96374 THER/PROPH/DIAG INJ IV PUSH: CPT

## 2025-04-25 PROCEDURE — 83735 ASSAY OF MAGNESIUM: CPT | Performed by: EMERGENCY MEDICINE

## 2025-04-25 PROCEDURE — 85025 COMPLETE CBC W/AUTO DIFF WBC: CPT | Performed by: EMERGENCY MEDICINE

## 2025-04-25 PROCEDURE — 70450 CT HEAD/BRAIN W/O DYE: CPT

## 2025-04-25 PROCEDURE — 70450 CT HEAD/BRAIN W/O DYE: CPT | Performed by: RADIOLOGY

## 2025-04-25 PROCEDURE — 99284 EMERGENCY DEPT VISIT MOD MDM: CPT | Mod: 25 | Performed by: EMERGENCY MEDICINE

## 2025-04-25 PROCEDURE — 82077 ASSAY SPEC XCP UR&BREATH IA: CPT | Performed by: EMERGENCY MEDICINE

## 2025-04-25 PROCEDURE — 96361 HYDRATE IV INFUSION ADD-ON: CPT

## 2025-04-25 PROCEDURE — 36415 COLL VENOUS BLD VENIPUNCTURE: CPT | Performed by: EMERGENCY MEDICINE

## 2025-04-25 PROCEDURE — 80053 COMPREHEN METABOLIC PANEL: CPT | Performed by: EMERGENCY MEDICINE

## 2025-04-25 PROCEDURE — 2500000004 HC RX 250 GENERAL PHARMACY W/ HCPCS (ALT 636 FOR OP/ED): Performed by: EMERGENCY MEDICINE

## 2025-04-25 RX ORDER — MUPIROCIN 20 MG/G
OINTMENT TOPICAL
Qty: 30 G | Refills: 0 | Status: SHIPPED | OUTPATIENT
Start: 2025-04-25 | End: 2025-04-25 | Stop reason: WASHOUT

## 2025-04-25 RX ORDER — LORAZEPAM 2 MG/ML
2 INJECTION INTRAMUSCULAR ONCE
Status: COMPLETED | OUTPATIENT
Start: 2025-04-25 | End: 2025-04-25

## 2025-04-25 RX ADMIN — LORAZEPAM 2 MG: 2 INJECTION INTRAMUSCULAR; INTRAVENOUS at 18:55

## 2025-04-25 RX ADMIN — SODIUM CHLORIDE 1000 ML: 9 INJECTION, SOLUTION INTRAVENOUS at 18:55

## 2025-04-25 ASSESSMENT — PAIN SCALES - GENERAL: PAINLEVEL_OUTOF10: 0 - NO PAIN

## 2025-04-25 ASSESSMENT — COLUMBIA-SUICIDE SEVERITY RATING SCALE - C-SSRS
6. HAVE YOU EVER DONE ANYTHING, STARTED TO DO ANYTHING, OR PREPARED TO DO ANYTHING TO END YOUR LIFE?: NO
1. IN THE PAST MONTH, HAVE YOU WISHED YOU WERE DEAD OR WISHED YOU COULD GO TO SLEEP AND NOT WAKE UP?: NO
2. HAVE YOU ACTUALLY HAD ANY THOUGHTS OF KILLING YOURSELF?: NO

## 2025-04-25 ASSESSMENT — PAIN - FUNCTIONAL ASSESSMENT: PAIN_FUNCTIONAL_ASSESSMENT: 0-10

## 2025-04-25 NOTE — ED TRIAGE NOTES
"Pt arrived to ED with complaints of left sided numbness. Rhythmic jerking movements of LUE present. Pt awake, alert and has appropriate verbal responses. Denies pain. Reports hx of seizures and takes keppra at home, took evening dose early due to \"not feeling right\"   "

## 2025-04-25 NOTE — ED PROVIDER NOTES
EMERGENCY DEPARTMENT ENCOUNTER      Pt Name: Lexy Dunaway  MRN: [id number]  Birthdate 1965  Date of evaluation: 4/25/2025  ED Provider: Karan Fitzgerald DO     CHIEF COMPLAINT       Chief Complaint   Patient presents with    Numbness     Left sided          HISTORY OF PRESENT ILLNESS   (Location/Symptom, Timing/Onset, Context/Setting, Quality, Duration, Modifying Factors, Severity) Note limiting factors.       HPI    Lexy Dunaway is a 59 y.o. who presents to the emergency department with history of seizure disorder for left arm pain and paresthesias of left arm.  Stated that she is not having sensation left side but no focal deficit.  States she has been compliant with her Keppra for epilepsy.  No treatment attempted prior to arrival.  Patient denies current CP, sob, fever, chills, abdominal pain, n/v/d/c, , weakness, loss/change of sensation or any other complaints at this time.      PMH/PSHx/Meds/Allergies/SH/FH as per nursing documentation and reviewed.         Nursing Notes were reviewed.    History obtained from :  patient    Outside records reviewed: N/A      History/Collateral information obtained from: N/A         Patient's PDMP reviewed personally    ED Course as of 04/26/25 0409 Fri Apr 25, 2025 1849 No acute seizure activity responded painful stimuli and stopped having tremors [SL]   1855 Discussed with patient at this timeframe would be unlikely that antibiotics can take in full effect, does not appear to have any abscess formation will give topical mupirocin discharge [SL]   1935 Patient sleeping comfortably in bed no acute distress nontremulous vital signs improving with hydration [SL]   2019 Patient resting comfortably in bed, nontremulous CT head negative for acute process will discharge follows outpatient [SL]      ED Course User Index  [SL] Karan Fitzgerald DO         Diagnoses as of 04/26/25 0409   Arm paresthesia, left        Discussion/ MDM:       Differential diagnosis includes  but not limited to: Musculoskeletal strain, seizure, muscle spasm    All results/imaging if obtained reviewed and interpreted, results trended/compared with previous levels if available , CT brain negative for acute process improved with Ativan  Discussed all results obtained from imaging or labs with patient and those present with patient´s permission, provided opportunity to ask any further questions, all questions answered to the best of my ability, feels comfortable with discharge and plan to follow up with PCP as outpatient. Will return at any time if symptoms worsen, new symptoms develop, or any new concerns arise. Chart created with voice recognition software, errors may be present due to software´s interpretation            REVIEW OF SYSTEMS     Review of Systems  Pertinent positives and negatives as per HPI    PAST MEDICAL HISTORY   Medical History[1]    SURGICAL HISTORY     Surgical History[2]    CURRENT MEDICATIONS       Discharge Medication List as of 4/25/2025  8:20 PM        CONTINUE these medications which have NOT CHANGED    Details   aspirin 81 mg EC tablet Take 1 tablet (81 mg) by mouth once daily., Starting Thu 5/9/2024, Normal      atorvastatin (Lipitor) 80 mg tablet Take 0.5 tablets (40 mg) by mouth once daily at bedtime., Starting Sat 8/17/2024, Normal      cyanocobalamin (Vitamin B-12) 1,000 mcg tablet Take 1 tablet (1,000 mcg) by mouth once daily., Starting Thu 5/9/2024, Normal      !! levETIRAcetam (Keppra) 1,000 mg tablet Take 1 tablet (1,000 mg) by mouth 2 times a day., Starting Sun 11/10/2024, Normal      !! levETIRAcetam (Keppra) 250 mg tablet Take 1 tablet (250 mg) by mouth once daily at bedtime. In addition to 1000 mg tablet, Starting Sat 8/17/2024, No Print      sodium chloride (Ocean) 0.65 % nasal spray Administer 1 spray into each nostril if needed for congestion., Historical Med       !! - Potential duplicate medications found. Please discuss with provider.          ALLERGIES      Atorvastatin, Codeine, Latex, and Penicillins    FAMILY HISTORY     Family History[3]     SOCIAL HISTORY     Social History[4]    PHYSICAL EXAM       ED Triage Vitals   Temperature Heart Rate Respirations BP   04/25/25 1829 04/25/25 1829 04/25/25 1829 04/25/25 1830   37 °C (98.6 °F) (!) 107 (!) 31 127/82      Pulse Ox Temp Source Heart Rate Source Patient Position   04/25/25 1829 04/25/25 1829 04/25/25 1829 04/25/25 1829   95 % Oral Monitor Lying      BP Location FiO2 (%)     04/25/25 1829 --     Right arm          Physical Exam  PHYSICIAL EXAM: Vitals reviewed   GENERAL: Awake alert in bed, no active seizure-like activity but is having jerking movements of arms, can stop at times when distracted, no acute distress, The patient appears nourished and normally developed. Vital signs as documented.   EYES: Head exam is unremarkable. No scleral icterus no nystagmus no gaze preference face symmetric no droop  Pupils equal round reactive bilaterally, EOMI grossly intact HEENT: Mucous membranes moist. Nares patent without copious rhinorrhea.   No evidence of trauma to tongue  LUNGS: no increased work of breathing, no conversational dyspnea, maintaining adequate SPO2 on room air  EXTREMITIES: No peripheral edema, with no obvious deformities.  Sensation intact to painful stimuli to left upper extremity which stopped jerking rhythmic movement  SKIN: Good color, with no significant rashes. No pallor.  NEURO: No obvious focal acute neurological deficits, normal sensation and strength bilaterally. NIH 0  PSYCH: Mildly anxious appearing otherwise mood and affect normal. Appropriate for age  DIAGNOSTIC RESULTS     RADIOLOGY (Per Emergency Physician):     Interpretation per the Radiologist below, if available at the time of this note:  CT head wo IV contrast   Final Result   No acute intracranial abnormality.        Right temporoparietal encephalomalacia redemonstrated.        MACRO:   None.        Signed by: Andrei Lu  "4/25/2025 7:52 PM   Dictation workstation:   [id number]            LABS:  Labs Reviewed   COMPREHENSIVE METABOLIC PANEL - Abnormal       Result Value    Glucose 84      Sodium 142      Potassium 3.4 (*)     Chloride 107      Bicarbonate 27      Anion Gap 11      Urea Nitrogen 6      Creatinine 0.83      eGFR 81      Calcium 9.4      Albumin 4.1      Alkaline Phosphatase 69      Total Protein 7.7      AST 19      Bilirubin, Total 0.5      ALT 20     MAGNESIUM - Normal    Magnesium 1.96     ALCOHOL - Normal    Alcohol <10     CBC WITH AUTO DIFFERENTIAL    WBC 7.8      nRBC 0.0      RBC 4.30      Hemoglobin 12.9      Hematocrit 38.4      MCV 89      MCH 30.0      MCHC 33.6      RDW 12.2      Platelets 235      Neutrophils % 52.7      Immature Granulocytes %, Automated 0.3      Lymphocytes % 35.8      Monocytes % 8.5      Eosinophils % 2.2      Basophils % 0.5      Neutrophils Absolute 4.12      Immature Granulocytes Absolute, Automated 0.02      Lymphocytes Absolute 2.79      Monocytes Absolute 0.66      Eosinophils Absolute 0.17      Basophils Absolute 0.04         All other labs were within normal range or not returned as of this dictation.    EMERGENCY DEPARTMENT COURSE :   Vitals:    Vitals:    04/25/25 1829 04/25/25 1830 04/25/25 1845   BP:  127/82 (!) 143/96   Pulse: (!) 107 (!) 112 100   Resp: (!) 31 (!) 33 20   Temp: 37 °C (98.6 °F)     TempSrc: Oral     SpO2: 95% 95% 95%   Weight: 99.8 kg (220 lb)     Height: 1.575 m (5' 2\")         Medications   LORazepam (Ativan) injection 2 mg (2 mg intravenous Given 4/25/25 1855)   sodium chloride 0.9 % bolus 1,000 mL (0 mL intravenous Stopped 4/25/25 1955)             PROCEDURES:  Unless otherwise noted below, none     Procedures    CRITICAL CARE TIME       FINAL IMPRESSION      1. Arm paresthesia, left          DISPOSITION    Discharge 04/25/2025 08:20:03 PM    PATIENT REFERRED TO:  No follow-up provider specified.    DISCHARGE MEDICATIONS:  Discharge Medication List " as of 4/25/2025  8:20 PM             (Comment: Please note this report has been produced using speech recognition software and may contain errors related to that system including errors in grammar, punctuation, and spelling, as well as words and phrases that may be inappropriate.  If there are any questions or concerns please feel free to contact the dictating provider for clarification.)    Karan Fitzgerald DO (electronically signed)  Emergency Medicine Provider         [1]   Past Medical History:  Diagnosis Date    Seizures (Multi)    [2] No past surgical history on file.  [3] No family history on file.  [4]   Social History  Socioeconomic History    Marital status: Single   Tobacco Use    Smoking status: Every Day     Current packs/day: 1.00     Types: Cigarettes    Smokeless tobacco: Never   Vaping Use    Vaping status: Every Day     Social Drivers of Health     Financial Resource Strain: Patient Declined (8/16/2024)    Overall Financial Resource Strain (CARDIA)     Difficulty of Paying Living Expenses: Patient declined   Food Insecurity: No Food Insecurity (8/16/2024)    Hunger Vital Sign     Worried About Running Out of Food in the Last Year: Never true     Ran Out of Food in the Last Year: Never true   Transportation Needs: Patient Declined (8/16/2024)    PRAPARE - Transportation     Lack of Transportation (Medical): Patient declined     Lack of Transportation (Non-Medical): Patient declined   Physical Activity: Sufficiently Active (8/16/2024)    Exercise Vital Sign     Days of Exercise per Week: 7 days     Minutes of Exercise per Session: 30 min   Recent Concern: Physical Activity - Inactive (8/16/2024)    Exercise Vital Sign     Days of Exercise per Week: 0 days     Minutes of Exercise per Session: 0 min   Stress: No Stress Concern Present (8/16/2024)    [employer] [place] [employer] - Occupational Stress Questionnaire     Feeling of Stress : Not at all   Recent Concern: Stress - Stress  Concern Present (8/16/2024)    [employer] [place] [employer] - Occupational Stress Questionnaire     Feeling of Stress : To some extent   Social Connections: Socially Isolated (8/16/2024)    Social Connection and Isolation Panel [NHANES]     Frequency of Communication with Friends and Family: Once a week     Frequency of Social Gatherings with Friends and Family: Never     Attends [employer] Services: Never     Active Member of Clubs or Organizations: No     Attends Club or Organization Meetings: Never     Marital Status:    Intimate Partner Violence: Not At Risk (8/16/2024)    Humiliation, Afraid, Rape, and Kick questionnaire     Fear of Current or Ex-Partner: No     Emotionally Abused: No     Physically Abused: No     Sexually Abused: No   Housing Stability: Patient Declined (8/16/2024)    Housing Stability Vital Sign     Unable to Pay for Housing in the Last Year: Patient declined     Number of Times Moved in the Last Year: 0     Homeless in the Last Year: Patient declined        Karan Fitzgerald DO  04/26/25 0409

## 2025-04-25 NOTE — DISCHARGE INSTRUCTIONS
Thank you for choosing [facility] Emergency Department and allowing me to take care of you today.     If your symptoms are not improving, begin to worsen, new symptoms develop/additional concerns arise, please return to the Emergency Department at any time for further evaluation and  treatment.     Dr. Karan Fitzgerald Jr., D.O.     Follow-up with your primary care doctor or any emergency department in the next 2-3 days for re-evaluation and further treatment as deemed necessary